# Patient Record
Sex: FEMALE | Race: WHITE | URBAN - METROPOLITAN AREA
[De-identification: names, ages, dates, MRNs, and addresses within clinical notes are randomized per-mention and may not be internally consistent; named-entity substitution may affect disease eponyms.]

---

## 2017-09-28 ENCOUNTER — APPOINTMENT (RX ONLY)
Dept: URBAN - METROPOLITAN AREA CLINIC 121 | Facility: CLINIC | Age: 72
Setting detail: DERMATOLOGY
End: 2017-09-28

## 2017-09-28 DIAGNOSIS — L57.0 ACTINIC KERATOSIS: ICD-10-CM

## 2017-09-28 DIAGNOSIS — B00.1 HERPESVIRAL VESICULAR DERMATITIS: ICD-10-CM

## 2017-09-28 DIAGNOSIS — L29.89 OTHER PRURITUS: ICD-10-CM

## 2017-09-28 DIAGNOSIS — L81.4 OTHER MELANIN HYPERPIGMENTATION: ICD-10-CM

## 2017-09-28 PROBLEM — L29.8 OTHER PRURITUS: Status: ACTIVE | Noted: 2017-09-28

## 2017-09-28 PROBLEM — D23.5 OTHER BENIGN NEOPLASM OF SKIN OF TRUNK: Status: ACTIVE | Noted: 2017-09-28

## 2017-09-28 PROCEDURE — ? REFUSAL OF TREATMENT

## 2017-09-28 PROCEDURE — ? COUNSELING

## 2017-09-28 PROCEDURE — ? TREATMENT REGIMEN

## 2017-09-28 PROCEDURE — ? PRESCRIPTION

## 2017-09-28 PROCEDURE — 99203 OFFICE O/P NEW LOW 30 MIN: CPT

## 2017-09-28 RX ORDER — BETAMETHASONE VALERATE 1 MG/ML
LOTION CUTANEOUS
Qty: 1 | Refills: 0 | Status: ERX | COMMUNITY
Start: 2017-09-28

## 2017-09-28 RX ADMIN — BETAMETHASONE VALERATE: 1 LOTION CUTANEOUS at 00:00

## 2017-09-28 ASSESSMENT — LOCATION DETAILED DESCRIPTION DERM
LOCATION DETAILED: NASAL SUPRATIP
LOCATION DETAILED: GLUTEAL CLEFT
LOCATION DETAILED: RIGHT INFERIOR MEDIAL MIDBACK
LOCATION DETAILED: LEFT ANTERIOR SHOULDER

## 2017-09-28 ASSESSMENT — LOCATION SIMPLE DESCRIPTION DERM
LOCATION SIMPLE: RIGHT LOWER BACK
LOCATION SIMPLE: NOSE
LOCATION SIMPLE: GLUTEAL CLEFT
LOCATION SIMPLE: LEFT SHOULDER

## 2017-09-28 ASSESSMENT — LOCATION ZONE DERM
LOCATION ZONE: TRUNK
LOCATION ZONE: ARM
LOCATION ZONE: NOSE

## 2017-09-28 NOTE — HPI: SKIN LESIONS
Have Your Skin Lesions Been Treated?: not been treated
Is This A New Presentation, Or A Follow-Up?: Skin Lesions
How Severe Is Your Skin Lesion?: moderate
Additional History: Pt also states her skin feels very itchy and some lesions on her buttocks.

## 2017-09-28 NOTE — PROCEDURE: TREATMENT REGIMEN
Detail Level: Zone
Plan: Sensative skin regimen
Initiate Treatment: Betamethasone valerate apply bid x 2 weeks on 2 weeks off
Plan: Pt only has crusted papules on the buttock today. We discussed that herpes is a possibility but hard to tell since lesions are healing. Encouraged pt to RTC as soon as she has another break out for culture and further evaluation.

## 2018-08-13 ENCOUNTER — APPOINTMENT (RX ONLY)
Dept: URBAN - METROPOLITAN AREA CLINIC 121 | Facility: CLINIC | Age: 73
Setting detail: DERMATOLOGY
End: 2018-08-13

## 2018-08-13 DIAGNOSIS — L30.9 DERMATITIS, UNSPECIFIED: ICD-10-CM

## 2018-08-13 PROCEDURE — 99213 OFFICE O/P EST LOW 20 MIN: CPT

## 2018-08-13 PROCEDURE — ? ORDER TESTS

## 2018-08-13 PROCEDURE — ? COUNSELING

## 2018-08-13 PROCEDURE — ? OTHER

## 2018-08-13 PROCEDURE — ? TREATMENT REGIMEN

## 2018-08-13 ASSESSMENT — LOCATION ZONE DERM: LOCATION ZONE: TRUNK

## 2018-08-13 ASSESSMENT — LOCATION SIMPLE DESCRIPTION DERM: LOCATION SIMPLE: RIGHT BUTTOCK

## 2018-08-13 ASSESSMENT — LOCATION DETAILED DESCRIPTION DERM: LOCATION DETAILED: RIGHT BUTTOCK

## 2018-08-13 NOTE — PROCEDURE: ORDER TESTS
Billing Type: [employer]
Bill For Surgical Tray: no
Performing Laboratory: [id number]
Expected Date Of Service: 08/13/2018

## 2018-08-13 NOTE — PROCEDURE: OTHER
Other (Free Text): Suggested biopsy- patient declined as she has allergy to lidocaine- unaware what reaction she has to the medication and what numbing agent she can use. Advised her to call dentist to find out what they use as she may need biopsy at follow up visit. Patient has previously been on acyclovir and developed severe upset stomach and diarrhea.
Detail Level: Zone
Note Text (......Xxx Chief Complaint.): This diagnosis correlates with the

## 2018-08-23 ENCOUNTER — APPOINTMENT (RX ONLY)
Dept: URBAN - METROPOLITAN AREA CLINIC 121 | Facility: CLINIC | Age: 73
Setting detail: DERMATOLOGY
End: 2018-08-23

## 2018-08-23 DIAGNOSIS — R20.2 PARESTHESIA OF SKIN: ICD-10-CM

## 2018-08-23 DIAGNOSIS — B00.1 HERPESVIRAL VESICULAR DERMATITIS: ICD-10-CM

## 2018-08-23 PROCEDURE — ? DIAGNOSIS COMMENT

## 2018-08-23 PROCEDURE — ? COUNSELING

## 2018-08-23 PROCEDURE — ? TREATMENT REGIMEN

## 2018-08-23 PROCEDURE — ? PRESCRIPTION

## 2018-08-23 PROCEDURE — 99213 OFFICE O/P EST LOW 20 MIN: CPT

## 2018-08-23 RX ORDER — ACYCLOVIR 50 MG/G
CREAM TOPICAL
Qty: 1 | Refills: 2 | Status: ERX | COMMUNITY
Start: 2018-08-23

## 2018-08-23 RX ADMIN — ACYCLOVIR: 50 CREAM TOPICAL at 00:00

## 2018-08-23 ASSESSMENT — LOCATION SIMPLE DESCRIPTION DERM
LOCATION SIMPLE: LOWER BACK
LOCATION SIMPLE: RIGHT UPPER BACK

## 2018-08-23 ASSESSMENT — LOCATION DETAILED DESCRIPTION DERM
LOCATION DETAILED: RIGHT SUPERIOR UPPER BACK
LOCATION DETAILED: INFERIOR LUMBAR SPINE

## 2018-08-23 ASSESSMENT — LOCATION ZONE DERM: LOCATION ZONE: TRUNK

## 2018-08-23 NOTE — PROCEDURE: TREATMENT REGIMEN
Detail Level: Detailed
Initiate Treatment: Apply topical acyclovir every 3 hrs for 7 days when develops a flare
Otc Regimen: Capzaicin

## 2018-08-27 ENCOUNTER — RX ONLY (OUTPATIENT)
Age: 73
Setting detail: RX ONLY
End: 2018-08-27

## 2018-08-27 RX ORDER — ACYCLOVIR 50 MG/G
CREAM TOPICAL
Qty: 1 | Refills: 3 | Status: ERX | COMMUNITY
Start: 2018-08-27

## 2018-08-27 RX ORDER — ACYCLOVIR 50 MG/G
CREAM TOPICAL
Qty: 1 | Refills: 3 | Status: ERX

## 2018-08-31 ENCOUNTER — RX ONLY (OUTPATIENT)
Age: 73
Setting detail: RX ONLY
End: 2018-08-31

## 2018-08-31 RX ORDER — ACYCLOVIR 50 MG/G
OINTMENT TOPICAL
Qty: 1 | Refills: 3 | Status: ERX | COMMUNITY
Start: 2018-08-31

## 2020-06-04 ENCOUNTER — OFFICE VISIT (OUTPATIENT)
Dept: URBAN - METROPOLITAN AREA CLINIC 60 | Facility: CLINIC | Age: 75
End: 2020-06-04

## 2020-07-16 ENCOUNTER — OFFICE VISIT (OUTPATIENT)
Dept: URBAN - METROPOLITAN AREA CLINIC 60 | Facility: CLINIC | Age: 75
End: 2020-07-16

## 2020-07-24 ENCOUNTER — OFFICE VISIT (OUTPATIENT)
Dept: URBAN - METROPOLITAN AREA CLINIC 63 | Facility: CLINIC | Age: 75
End: 2020-07-24

## 2020-08-27 ENCOUNTER — OFFICE VISIT (OUTPATIENT)
Dept: URBAN - METROPOLITAN AREA CLINIC 60 | Facility: CLINIC | Age: 75
End: 2020-08-27

## 2020-10-27 ENCOUNTER — OFFICE VISIT (OUTPATIENT)
Dept: URBAN - METROPOLITAN AREA CLINIC 60 | Facility: CLINIC | Age: 75
End: 2020-10-27

## 2021-03-25 ENCOUNTER — OFFICE VISIT (OUTPATIENT)
Dept: URBAN - METROPOLITAN AREA CLINIC 60 | Facility: CLINIC | Age: 76
End: 2021-03-25

## 2021-04-01 ENCOUNTER — OFFICE VISIT (OUTPATIENT)
Dept: URBAN - METROPOLITAN AREA CLINIC 60 | Facility: CLINIC | Age: 76
End: 2021-04-01

## 2021-04-14 ENCOUNTER — OFFICE VISIT (OUTPATIENT)
Dept: URBAN - METROPOLITAN AREA CLINIC 60 | Facility: CLINIC | Age: 76
End: 2021-04-14

## 2021-05-05 ENCOUNTER — OFFICE VISIT (OUTPATIENT)
Dept: URBAN - METROPOLITAN AREA CLINIC 60 | Facility: CLINIC | Age: 76
End: 2021-05-05

## 2021-06-16 ENCOUNTER — OFFICE VISIT (OUTPATIENT)
Dept: URBAN - METROPOLITAN AREA CLINIC 60 | Facility: CLINIC | Age: 76
End: 2021-06-16

## 2021-07-14 ENCOUNTER — OFFICE VISIT (OUTPATIENT)
Dept: URBAN - METROPOLITAN AREA CLINIC 60 | Facility: CLINIC | Age: 76
End: 2021-07-14

## 2021-07-15 ENCOUNTER — OFFICE VISIT (OUTPATIENT)
Dept: URBAN - METROPOLITAN AREA SURGERY CENTER 4 | Facility: SURGERY CENTER | Age: 76
End: 2021-07-15

## 2021-07-22 ENCOUNTER — OFFICE VISIT (OUTPATIENT)
Dept: URBAN - METROPOLITAN AREA SURGERY CENTER 4 | Facility: SURGERY CENTER | Age: 76
End: 2021-07-22

## 2021-08-11 ENCOUNTER — OFFICE VISIT (OUTPATIENT)
Dept: URBAN - METROPOLITAN AREA CLINIC 60 | Facility: CLINIC | Age: 76
End: 2021-08-11

## 2021-08-25 ENCOUNTER — OFFICE VISIT (OUTPATIENT)
Dept: URBAN - METROPOLITAN AREA CLINIC 60 | Facility: CLINIC | Age: 76
End: 2021-08-25

## 2021-09-01 ENCOUNTER — OFFICE VISIT (OUTPATIENT)
Dept: URBAN - METROPOLITAN AREA CLINIC 60 | Facility: CLINIC | Age: 76
End: 2021-09-01

## 2021-09-02 ENCOUNTER — LAB OUTSIDE AN ENCOUNTER (OUTPATIENT)
Dept: URBAN - METROPOLITAN AREA CLINIC 121 | Facility: CLINIC | Age: 76
End: 2021-09-02

## 2021-09-20 LAB — HELICOBACTER PYLORI AG, EIA, STOOL: (no result)

## 2021-09-28 LAB
IMMUNOGLOBULIN A: (no result)
INTERPRETATION: (no result)
TISSUE TRANSGLUTAMINASE AB, IGA: (no result)

## 2021-10-11 ENCOUNTER — OFFICE VISIT (OUTPATIENT)
Dept: URBAN - METROPOLITAN AREA SURGERY CENTER 4 | Facility: SURGERY CENTER | Age: 76
End: 2021-10-11

## 2021-10-20 ENCOUNTER — OFFICE VISIT (OUTPATIENT)
Dept: URBAN - METROPOLITAN AREA CLINIC 60 | Facility: CLINIC | Age: 76
End: 2021-10-20

## 2022-01-04 ENCOUNTER — OFFICE VISIT (OUTPATIENT)
Dept: URBAN - METROPOLITAN AREA CLINIC 63 | Facility: CLINIC | Age: 77
End: 2022-01-04

## 2022-01-07 ENCOUNTER — OFFICE VISIT (OUTPATIENT)
Dept: URBAN - METROPOLITAN AREA CLINIC 63 | Facility: CLINIC | Age: 77
End: 2022-01-07

## 2022-01-19 ENCOUNTER — OFFICE VISIT (OUTPATIENT)
Dept: URBAN - METROPOLITAN AREA CLINIC 60 | Facility: CLINIC | Age: 77
End: 2022-01-19

## 2022-01-24 ENCOUNTER — OFFICE VISIT (OUTPATIENT)
Dept: URBAN - METROPOLITAN AREA SURGERY CENTER 4 | Facility: SURGERY CENTER | Age: 77
End: 2022-01-24

## 2022-01-27 LAB — PATHOLOGY (INDENTED REPORT): (no result)

## 2022-02-09 ENCOUNTER — OFFICE VISIT (OUTPATIENT)
Dept: URBAN - METROPOLITAN AREA CLINIC 60 | Facility: CLINIC | Age: 77
End: 2022-02-09

## 2022-04-06 ENCOUNTER — OFFICE VISIT (OUTPATIENT)
Dept: URBAN - METROPOLITAN AREA CLINIC 60 | Facility: CLINIC | Age: 77
End: 2022-04-06

## 2022-05-21 ENCOUNTER — APPOINTMENT (RX ONLY)
Dept: URBAN - METROPOLITAN AREA CLINIC 334 | Facility: CLINIC | Age: 77
Setting detail: DERMATOLOGY
End: 2022-05-21

## 2022-05-21 DIAGNOSIS — L29.89 OTHER PRURITUS: ICD-10-CM | Status: WORSENING

## 2022-05-21 DIAGNOSIS — L82.1 OTHER SEBORRHEIC KERATOSIS: ICD-10-CM

## 2022-05-21 DIAGNOSIS — L81.4 OTHER MELANIN HYPERPIGMENTATION: ICD-10-CM

## 2022-05-21 DIAGNOSIS — Z12.83 ENCOUNTER FOR SCREENING FOR MALIGNANT NEOPLASM OF SKIN: ICD-10-CM

## 2022-05-21 DIAGNOSIS — L29.8 OTHER PRURITUS: ICD-10-CM | Status: WORSENING

## 2022-05-21 DIAGNOSIS — D18.0 HEMANGIOMA: ICD-10-CM

## 2022-05-21 PROBLEM — D18.01 HEMANGIOMA OF SKIN AND SUBCUTANEOUS TISSUE: Status: ACTIVE | Noted: 2022-05-21

## 2022-05-21 PROCEDURE — ? PRESCRIPTION

## 2022-05-21 PROCEDURE — ? FULL BODY SKIN EXAM

## 2022-05-21 PROCEDURE — 99214 OFFICE O/P EST MOD 30 MIN: CPT

## 2022-05-21 PROCEDURE — ? COUNSELING

## 2022-05-21 PROCEDURE — ? TREATMENT REGIMEN

## 2022-05-21 RX ORDER — TRIAMCINOLONE ACETONIDE 1 MG/G
CREAM TOPICAL BID
Qty: 453.6 | Refills: 3 | Status: ERX | COMMUNITY
Start: 2022-05-21

## 2022-05-21 RX ADMIN — TRIAMCINOLONE ACETONIDE: 1 CREAM TOPICAL at 00:00

## 2022-05-21 ASSESSMENT — LOCATION DETAILED DESCRIPTION DERM
LOCATION DETAILED: RIGHT INFERIOR MEDIAL UPPER BACK
LOCATION DETAILED: LEFT MEDIAL SUPERIOR CHEST
LOCATION DETAILED: UPPER STERNUM
LOCATION DETAILED: PERIUMBILICAL SKIN

## 2022-05-21 ASSESSMENT — LOCATION SIMPLE DESCRIPTION DERM
LOCATION SIMPLE: RIGHT UPPER BACK
LOCATION SIMPLE: CHEST
LOCATION SIMPLE: ABDOMEN

## 2022-05-21 ASSESSMENT — LOCATION ZONE DERM: LOCATION ZONE: TRUNK

## 2022-05-21 NOTE — PROCEDURE: COUNSELING
Antihistamine Recommendations: Take a Claritin daily
Cleanser Recommendations: Use cerave  only
Moisturizer Recommendations: Use cerave moisturizer daily
Detail Level: Detailed
Detail Level: Generalized

## 2022-05-21 NOTE — HPI: EVALUATION OF SKIN LESION(S)
What Type Of Note Output Would You Prefer (Optional)?: Bullet Format
Hpi Title: Evaluation of Skin Lesions
Additional History: Patient is itchy all over. She has a list of drug allergies.

## 2022-06-14 ENCOUNTER — OFFICE VISIT (OUTPATIENT)
Dept: URBAN - METROPOLITAN AREA CLINIC 63 | Facility: CLINIC | Age: 77
End: 2022-06-14

## 2022-07-05 ENCOUNTER — TELEPHONE ENCOUNTER (OUTPATIENT)
Dept: URBAN - METROPOLITAN AREA CLINIC 63 | Facility: CLINIC | Age: 77
End: 2022-07-05

## 2022-07-06 ENCOUNTER — TELEPHONE ENCOUNTER (OUTPATIENT)
Dept: URBAN - METROPOLITAN AREA CLINIC 63 | Facility: CLINIC | Age: 77
End: 2022-07-06

## 2022-07-09 ENCOUNTER — TELEPHONE ENCOUNTER (OUTPATIENT)
Dept: URBAN - METROPOLITAN AREA CLINIC 121 | Facility: CLINIC | Age: 77
End: 2022-07-09

## 2022-07-09 RX ORDER — LORAZEPAM 1 MG/1
TABLET ORAL TWICE A DAY
Refills: 0 | OUTPATIENT
Start: 2019-08-16 | End: 2019-09-24

## 2022-07-09 RX ORDER — NON-ADHERENT BANDAGE 3"X4"
BANDAGE TOPICAL
Refills: 0 | OUTPATIENT
Start: 2019-03-06 | End: 2019-08-16

## 2022-07-09 RX ORDER — CYANOCOBALAMIN 1000 UG/ML
TWICE A MONTH INJECTION INTRAMUSCULAR; SUBCUTANEOUS
Refills: 0 | OUTPATIENT
Start: 2019-10-09 | End: 2022-01-07

## 2022-07-09 RX ORDER — DOXEPIN HYDROCHLORIDE 10 MG/1
CAPSULE ORAL ONCE A DAY
Refills: 0 | OUTPATIENT
Start: 2022-01-07 | End: 2022-01-19

## 2022-07-09 RX ORDER — PANTOPRAZOLE SODIUM 40 MG/1
TABLET, DELAYED RELEASE ORAL TWICE A DAY
Refills: 0 | OUTPATIENT
Start: 2019-08-16 | End: 2019-09-24

## 2022-07-09 RX ORDER — LORAZEPAM 1 MG/1
TABLET ORAL TWICE A DAY
Refills: 0 | OUTPATIENT
Start: 2021-05-05 | End: 2021-08-25

## 2022-07-09 RX ORDER — SENNOSIDES 8.6 MG/1
3 TAB HS TABLET, FILM COATED ORAL ONCE A DAY
Refills: 0 | OUTPATIENT
Start: 2021-05-05 | End: 2021-05-05

## 2022-07-09 RX ORDER — TIZANIDINE 2 MG/1
TABLET ORAL ONCE A DAY
Refills: 0 | OUTPATIENT
Start: 2022-01-07 | End: 2022-01-19

## 2022-07-09 RX ORDER — LORAZEPAM 1 MG/1
TABLET ORAL TWICE A DAY
Refills: 0 | OUTPATIENT
Start: 2019-06-24 | End: 2019-08-16

## 2022-07-09 RX ORDER — TIZANIDINE 2 MG/1
TABLET ORAL
Refills: 0 | OUTPATIENT
Start: 2021-08-25 | End: 2022-01-07

## 2022-07-09 RX ORDER — NON-ADHERENT BANDAGE 3"X4"
BANDAGE TOPICAL
Refills: 0 | OUTPATIENT
Start: 2019-09-24 | End: 2019-10-09

## 2022-07-09 RX ORDER — SENNOSIDES 8.6 MG
TABLET ORAL
Refills: 0 | OUTPATIENT
Start: 2019-06-24 | End: 2019-08-16

## 2022-07-09 RX ORDER — PRUCALOPRIDE 2 MG/1
ONCE A DAY TABLET, FILM COATED ORAL ONCE A DAY
Refills: 0 | OUTPATIENT
Start: 2021-06-17 | End: 2021-08-25

## 2022-07-09 RX ORDER — PANTOPRAZOLE 20 MG/1
TWICE A DAY TABLET, DELAYED RELEASE ORAL TWICE A DAY
Refills: 1 | OUTPATIENT
Start: 2019-09-24 | End: 2020-05-15

## 2022-07-09 RX ORDER — RABEPRAZOLE SODIUM 20 MG/1
TWICE A DAY TABLET, DELAYED RELEASE ORAL TWICE A DAY
Refills: 1 | OUTPATIENT
Start: 2021-08-25 | End: 2022-01-07

## 2022-07-09 RX ORDER — SENNOSIDES 8.6 MG/1
3 TAB HS TABLET, FILM COATED ORAL
Refills: 0 | OUTPATIENT
Start: 2019-09-24 | End: 2019-10-09

## 2022-07-09 RX ORDER — DOXEPIN HYDROCHLORIDE 10 MG/1
CAPSULE ORAL ONCE A DAY
Refills: 0 | OUTPATIENT
Start: 2022-01-19 | End: 2022-06-14

## 2022-07-09 RX ORDER — PANTOPRAZOLE SODIUM 40 MG/1
TABLET, DELAYED RELEASE ORAL
Refills: 0 | OUTPATIENT
Start: 2018-03-06 | End: 2018-05-14

## 2022-07-09 RX ORDER — CHLORHEXIDINE GLUCONATE 1.2 MG/ML
RINSE ORAL ONCE A DAY
Refills: 0 | OUTPATIENT
Start: 2022-01-07 | End: 2022-01-19

## 2022-07-09 RX ORDER — SIMETHICONE 125 MG
PRN CAPSULE ORAL
Refills: 0 | OUTPATIENT
Start: 2018-10-02 | End: 2019-03-06

## 2022-07-09 RX ORDER — PANTOPRAZOLE 20 MG/1
TABLET, DELAYED RELEASE ORAL ONCE A DAY
Refills: 0 | OUTPATIENT
Start: 2022-01-19 | End: 2022-01-19

## 2022-07-09 RX ORDER — CYANOCOBALAMIN 1000 UG/ML
TWICE A MONTH INJECTION INTRAMUSCULAR; SUBCUTANEOUS
Refills: 0 | OUTPATIENT
Start: 2019-09-24 | End: 2019-10-09

## 2022-07-09 RX ORDER — SIMETHICONE 125 MG
PRN CAPSULE ORAL
Refills: 0 | OUTPATIENT
Start: 2019-10-09 | End: 2021-05-05

## 2022-07-09 RX ORDER — LORAZEPAM 1 MG/1
TABLET ORAL
Refills: 0 | OUTPATIENT
Start: 2018-10-02 | End: 2019-03-06

## 2022-07-09 RX ORDER — CALCIUM CARBONATE 500 MG/1
TABLET, CHEWABLE ORAL AS NEEDED
Refills: 0 | OUTPATIENT
Start: 2022-01-19 | End: 2022-06-14

## 2022-07-09 RX ORDER — CALCIUM CARBONATE 500 MG/1
TABLET, CHEWABLE ORAL
Refills: 0 | OUTPATIENT
Start: 2019-08-16 | End: 2019-09-24

## 2022-07-09 RX ORDER — LORAZEPAM 1 MG/1
TABLET ORAL
Refills: 0 | OUTPATIENT
Start: 2019-03-06 | End: 2019-06-24

## 2022-07-09 RX ORDER — PSYLLIUM HUSK 3.4G/5.8G
POWDER (GRAM) ORAL
Refills: 0 | OUTPATIENT
Start: 2018-08-20 | End: 2018-10-02

## 2022-07-09 RX ORDER — PANTOPRAZOLE SODIUM 40 MG/1
TABLET, DELAYED RELEASE ORAL
Refills: 0 | OUTPATIENT
Start: 2018-05-14 | End: 2018-08-20

## 2022-07-09 RX ORDER — MILK THISTLE 100 %
POWDER (GRAM) MISCELLANEOUS
Refills: 0 | OUTPATIENT
Start: 2021-08-25 | End: 2022-01-07

## 2022-07-09 RX ORDER — SIMETHICONE 125 MG
PRN CAPSULE ORAL
Refills: 0 | OUTPATIENT
Start: 2019-09-24 | End: 2019-10-09

## 2022-07-09 RX ORDER — TIZANIDINE 2 MG/1
TABLET ORAL
Refills: 0 | OUTPATIENT
Start: 2021-04-21 | End: 2021-08-25

## 2022-07-09 RX ORDER — SENNOSIDES 8.6 MG
TABLET ORAL
Refills: 0 | OUTPATIENT
Start: 2018-10-02 | End: 2019-03-06

## 2022-07-09 RX ORDER — MILK THISTLE 100 %
POWDER (GRAM) MISCELLANEOUS
Refills: 0 | OUTPATIENT
Start: 2021-05-05 | End: 2021-08-25

## 2022-07-09 RX ORDER — SIMETHICONE 125 MG
PRN CAPSULE ORAL
Refills: 0 | OUTPATIENT
Start: 2019-08-16 | End: 2019-09-24

## 2022-07-09 RX ORDER — CALCIUM CARBONATE 500 MG/1
TABLET, CHEWABLE ORAL
Refills: 0 | OUTPATIENT
Start: 2019-10-09 | End: 2021-05-05

## 2022-07-09 RX ORDER — LORAZEPAM 1 MG/1
TABLET ORAL ONCE A DAY
Refills: 0 | OUTPATIENT
Start: 2022-01-19 | End: 2022-06-14

## 2022-07-09 RX ORDER — PANTOPRAZOLE 20 MG/1
TWICE A DAY TABLET, DELAYED RELEASE ORAL TWICE A DAY
Refills: 1 | OUTPATIENT
Start: 2021-05-06 | End: 2021-08-25

## 2022-07-09 RX ORDER — SENNOSIDES 8.6 MG
TABLET ORAL
Refills: 0 | OUTPATIENT
Start: 2019-03-06 | End: 2019-06-24

## 2022-07-09 RX ORDER — PANTOPRAZOLE 20 MG/1
TAKE 1 TABLET BY MOUTH TWICE DAILY TABLET, DELAYED RELEASE ORAL
Refills: 3 | OUTPATIENT
Start: 2020-07-08 | End: 2020-10-27

## 2022-07-09 RX ORDER — LANSOPRAZOLE 30 MG/1
TWICE A DAY CAPSULE, DELAYED RELEASE ORAL TWICE A DAY
Refills: 1 | OUTPATIENT
Start: 2021-08-25 | End: 2021-08-25

## 2022-07-09 RX ORDER — SIMETHICONE 125 MG
PRN CAPSULE ORAL ONCE A DAY
Refills: 0 | OUTPATIENT
Start: 2022-01-19 | End: 2022-06-14

## 2022-07-09 RX ORDER — SENNOSIDES 8.6 MG
TABLET ORAL
Refills: 0 | OUTPATIENT
Start: 2019-09-24 | End: 2019-10-09

## 2022-07-09 RX ORDER — SIMETHICONE 125 MG
PRN CAPSULE ORAL ONCE A DAY
Refills: 0 | OUTPATIENT
Start: 2022-01-07 | End: 2022-01-19

## 2022-07-09 RX ORDER — SUCRALFATE 1 G/1
TWICE A DAY TABLET ORAL TWICE A DAY
Refills: 1 | OUTPATIENT
Start: 2018-08-23 | End: 2018-10-02

## 2022-07-09 RX ORDER — NON-ADHERENT BANDAGE 3"X4"
BANDAGE TOPICAL
Refills: 0 | OUTPATIENT
Start: 2019-10-09 | End: 2022-01-07

## 2022-07-09 RX ORDER — CALCIUM CARBONATE 500 MG/1
TABLET, CHEWABLE ORAL TAKE AS DIRECTED
Refills: 0 | OUTPATIENT
Start: 2022-01-07 | End: 2022-01-19

## 2022-07-09 RX ORDER — SENNOSIDES 8.6 MG/1
3 TAB HS TABLET, FILM COATED ORAL
Refills: 0 | OUTPATIENT
Start: 2019-10-09 | End: 2021-05-05

## 2022-07-09 RX ORDER — PANTOPRAZOLE SODIUM 40 MG/1
TWICE A DAY TABLET, DELAYED RELEASE ORAL TWICE A DAY
Refills: 3 | OUTPATIENT
Start: 2021-05-05 | End: 2021-08-25

## 2022-07-09 RX ORDER — EMPAGLIFLOZIN 10 MG/1
TABLET, FILM COATED ORAL
Refills: 0 | OUTPATIENT
Start: 2021-02-23 | End: 2021-08-25

## 2022-07-09 RX ORDER — ATENOLOL 25 MG/1
TABLET ORAL ONCE A DAY
Refills: 0 | OUTPATIENT
Start: 2022-01-07 | End: 2022-01-19

## 2022-07-09 RX ORDER — SENNOSIDES 8.6 MG/1
3 TAB HS TABLET, FILM COATED ORAL
Refills: 0 | OUTPATIENT
Start: 2019-08-16 | End: 2019-09-24

## 2022-07-09 RX ORDER — SENNOSIDES 8.6 MG
TABLET ORAL
Refills: 0 | OUTPATIENT
Start: 2018-08-23 | End: 2018-10-02

## 2022-07-09 RX ORDER — SENNOSIDES 8.6 MG
TABLET ORAL AS NEEDED
Refills: 0 | OUTPATIENT
Start: 2022-01-19 | End: 2022-06-14

## 2022-07-09 RX ORDER — PANTOPRAZOLE SODIUM 40 MG/1
TABLET, DELAYED RELEASE ORAL
Refills: 0 | OUTPATIENT
Start: 2019-06-24 | End: 2019-06-24

## 2022-07-09 RX ORDER — CHLORDIAZEPOXIDE HYDROCHLORIDE AND CLIDINIUM BROMIDE 5; 2.5 MG/1; MG/1
THREE TIMES A DAY AS NEEDED FOR ABDOMINAL CAPSULE ORAL THREE TIMES A DAY
Refills: 1 | OUTPATIENT
Start: 2020-07-24 | End: 2020-10-27

## 2022-07-09 RX ORDER — LORAZEPAM 1 MG/1
TABLET ORAL
Refills: 0 | OUTPATIENT
Start: 2018-03-06 | End: 2018-05-14

## 2022-07-09 RX ORDER — CHLORHEXIDINE GLUCONATE 1.2 MG/ML
RINSE ORAL ONCE A DAY
Refills: 0 | OUTPATIENT
Start: 2022-01-19 | End: 2022-06-14

## 2022-07-09 RX ORDER — PSYLLIUM HUSK 3.4G/5.8G
POWDER (GRAM) ORAL
Refills: 0 | OUTPATIENT
Start: 2018-05-14 | End: 2018-08-20

## 2022-07-09 RX ORDER — SENNOSIDES 8.6 MG
TABLET ORAL
Refills: 0 | OUTPATIENT
Start: 2019-08-16 | End: 2019-09-24

## 2022-07-09 RX ORDER — SIMETHICONE 125 MG
PRN CAPSULE ORAL ONCE A DAY
Refills: 0 | OUTPATIENT
Start: 2021-05-05 | End: 2022-01-07

## 2022-07-09 RX ORDER — LORAZEPAM 1 MG/1
TABLET ORAL TWICE A DAY
Refills: 0 | OUTPATIENT
Start: 2019-09-24 | End: 2019-10-09

## 2022-07-09 RX ORDER — RIVAROXABAN 20 MG/1
TABLET, FILM COATED ORAL
Refills: 0 | OUTPATIENT
Start: 2021-02-23 | End: 2021-08-25

## 2022-07-09 RX ORDER — TIZANIDINE 2 MG/1
TABLET ORAL ONCE A DAY
Refills: 0 | OUTPATIENT
Start: 2022-01-19 | End: 2022-06-14

## 2022-07-09 RX ORDER — PANTOPRAZOLE 20 MG/1
TABLET, DELAYED RELEASE ORAL
Refills: 0 | OUTPATIENT
Start: 2022-01-07 | End: 2022-01-19

## 2022-07-09 RX ORDER — PANTOPRAZOLE SODIUM 40 MG/1
TABLET, DELAYED RELEASE ORAL
Refills: 0 | OUTPATIENT
Start: 2018-08-20 | End: 2018-10-02

## 2022-07-09 RX ORDER — RABEPRAZOLE SODIUM 20 MG/1
TABLET, DELAYED RELEASE ORAL ONCE A DAY
Refills: 0 | OUTPATIENT
Start: 2022-01-07 | End: 2022-01-07

## 2022-07-09 RX ORDER — DOXYCYCLINE HYCLATE 100 MG/1
TABLET ORAL
Refills: 0 | OUTPATIENT
Start: 2021-03-09 | End: 2021-05-05

## 2022-07-09 RX ORDER — SENNOSIDES 8.6 MG
TABLET ORAL
Refills: 0 | OUTPATIENT
Start: 2019-10-09 | End: 2020-10-27

## 2022-07-09 RX ORDER — GLYCERIN 5.4 G/1
LIQUID RECTAL
Refills: 0 | OUTPATIENT
Start: 2019-09-24 | End: 2019-10-09

## 2022-07-09 RX ORDER — SIMETHICONE 125 MG
PRN CAPSULE ORAL
Refills: 0 | OUTPATIENT
Start: 2019-06-24 | End: 2019-08-16

## 2022-07-09 RX ORDER — COCOA BUTTER, PHENYLEPHRINE HCL 2211; 6.5 MG/1; MG/1
SUPPOSITORY RECTAL TAKE AS DIRECTED
Refills: 0 | OUTPATIENT
Start: 2020-10-27 | End: 2021-08-25

## 2022-07-09 RX ORDER — FAMOTIDINE 10 MG
ONCE A DAY TABLET ORAL ONCE A DAY
Refills: 3 | OUTPATIENT
Start: 2018-08-23 | End: 2018-10-02

## 2022-07-09 RX ORDER — OMEPRAZOLE 40 MG/1
ONCE A DAY CAPSULE, DELAYED RELEASE ORAL ONCE A DAY
Refills: 1 | OUTPATIENT
Start: 2019-08-21 | End: 2020-05-15

## 2022-07-09 RX ORDER — NON-ADHERENT BANDAGE 3"X4"
BANDAGE TOPICAL
Refills: 0 | OUTPATIENT
Start: 2019-08-16 | End: 2019-09-24

## 2022-07-09 RX ORDER — CALCIUM CARBONATE 500 MG/1
TABLET, CHEWABLE ORAL
Refills: 0 | OUTPATIENT
Start: 2019-09-24 | End: 2019-10-09

## 2022-07-09 RX ORDER — PANTOPRAZOLE 20 MG/1
TWICE A DAY TABLET, DELAYED RELEASE ORAL TWICE A DAY
Refills: 3 | OUTPATIENT
Start: 2018-08-23 | End: 2018-10-02

## 2022-07-09 RX ORDER — BISMUTH SUBSALICYLATE 262 MG
TABLET,CHEWABLE ORAL AS NEEDED
Refills: 0 | OUTPATIENT
Start: 2022-01-19 | End: 2022-06-14

## 2022-07-09 RX ORDER — RABEPRAZOLE SODIUM 20 MG/1
TWICE A DAY TABLET, DELAYED RELEASE ORAL TWICE A DAY
Refills: 1 | OUTPATIENT
Start: 2019-03-06 | End: 2019-06-24

## 2022-07-09 RX ORDER — HYDROCORTISONE 2.5% 25 MG/G
FOUR TIMES A DAY CREAM TOPICAL
Refills: 0 | OUTPATIENT
Start: 2019-03-06 | End: 2019-06-24

## 2022-07-09 RX ORDER — CYANOCOBALAMIN 1000 UG/ML
TWICE A MONTH INJECTION INTRAMUSCULAR; SUBCUTANEOUS
Refills: 0 | OUTPATIENT
Start: 2019-08-16 | End: 2019-09-24

## 2022-07-09 RX ORDER — PANTOPRAZOLE SODIUM 40 MG/1
TABLET, DELAYED RELEASE ORAL TWICE A DAY
Refills: 0 | OUTPATIENT
Start: 2019-06-24 | End: 2019-08-16

## 2022-07-09 RX ORDER — CYANOCOBALAMIN 1000 UG/ML
TWICE A MONTH INJECTION INTRAMUSCULAR; SUBCUTANEOUS
Refills: 0 | OUTPATIENT
Start: 2019-06-24 | End: 2019-08-16

## 2022-07-09 RX ORDER — GLYCERIN 5.4 G/1
LIQUID RECTAL
Refills: 0 | OUTPATIENT
Start: 2019-10-09 | End: 2020-10-27

## 2022-07-09 RX ORDER — MILK THISTLE 100 %
POWDER (GRAM) MISCELLANEOUS
Refills: 0 | OUTPATIENT
Start: 2019-10-09 | End: 2021-05-05

## 2022-07-09 RX ORDER — GLYCERIN 5.4 G/1
LIQUID RECTAL
Refills: 0 | OUTPATIENT
Start: 2019-08-16 | End: 2019-09-24

## 2022-07-09 RX ORDER — LORAZEPAM 1 MG/1
TABLET ORAL
Refills: 0 | OUTPATIENT
Start: 2018-08-20 | End: 2018-10-02

## 2022-07-09 RX ORDER — LORAZEPAM 1 MG/1
TABLET ORAL TWICE A DAY
Refills: 0 | OUTPATIENT
Start: 2019-10-09 | End: 2021-05-05

## 2022-07-09 RX ORDER — LACTULOSE 10 G/15ML
15ML 1-2 TIMES DAILY FOR CONSTIPATION SOLUTION ORAL
Refills: 2 | OUTPATIENT
Start: 2021-05-05 | End: 2021-08-25

## 2022-07-09 RX ORDER — PANTOPRAZOLE 20 MG/1
TABLET, DELAYED RELEASE ORAL ONCE A DAY
Refills: 0 | OUTPATIENT
Start: 2022-01-07 | End: 2022-01-07

## 2022-07-09 RX ORDER — DICYCLOMINE HYDROCHLORIDE 10 MG/1
1 CAPSULE UP TO THREE TIMES A DAY AS NEEDED FOR ABDOMINAL PAIN CAPSULE ORAL THREE TIMES A DAY
Refills: 2 | OUTPATIENT
Start: 2020-05-15 | End: 2020-10-27

## 2022-07-09 RX ORDER — LORAZEPAM 1 MG/1
TABLET ORAL TWICE A DAY
Refills: 0 | OUTPATIENT
Start: 2021-08-25 | End: 2022-01-07

## 2022-07-09 RX ORDER — PANTOPRAZOLE 20 MG/1
TWICE A DAY TABLET, DELAYED RELEASE ORAL TWICE A DAY
Refills: 0 | OUTPATIENT
Start: 2021-04-06 | End: 2021-08-25

## 2022-07-09 RX ORDER — ATENOLOL 25 MG/1
1.25 MG TABLET ORAL
Refills: 0 | OUTPATIENT
Start: 2022-01-19 | End: 2022-06-14

## 2022-07-09 RX ORDER — PANTOPRAZOLE SODIUM 40 MG/1
TABLET, DELAYED RELEASE ORAL ONCE A DAY
Refills: 0 | OUTPATIENT
Start: 2020-10-27 | End: 2021-08-25

## 2022-07-09 RX ORDER — LORAZEPAM 1 MG/1
TABLET ORAL ONCE A DAY
Refills: 0 | OUTPATIENT
Start: 2022-01-07 | End: 2022-01-19

## 2022-07-09 RX ORDER — PSYLLIUM HUSK 3.4G/5.8G
POWDER (GRAM) ORAL
Refills: 0 | OUTPATIENT
Start: 2018-03-06 | End: 2018-05-14

## 2022-07-09 RX ORDER — SIMETHICONE 125 MG
PRN CAPSULE ORAL
Refills: 0 | OUTPATIENT
Start: 2019-03-06 | End: 2019-06-24

## 2022-07-09 RX ORDER — CALCIUM CARBONATE 500 MG/1
TABLET, CHEWABLE ORAL TAKE AS DIRECTED
Refills: 0 | OUTPATIENT
Start: 2021-05-05 | End: 2022-01-07

## 2022-07-09 RX ORDER — PANTOPRAZOLE SODIUM 40 MG/1
TABLET, DELAYED RELEASE ORAL TWICE A DAY
Refills: 0 | OUTPATIENT
Start: 2019-09-24 | End: 2019-10-09

## 2022-07-09 RX ORDER — LORAZEPAM 1 MG/1
TABLET ORAL
Refills: 0 | OUTPATIENT
Start: 2018-05-14 | End: 2018-08-20

## 2022-07-09 RX ORDER — PANTOPRAZOLE 20 MG/1
TABLET, DELAYED RELEASE ORAL ONCE A DAY
Refills: 0 | OUTPATIENT
Start: 2022-01-19 | End: 2022-06-14

## 2022-07-09 RX ORDER — PANTOPRAZOLE SODIUM 40 MG/1
TABLET, DELAYED RELEASE ORAL TWICE A DAY
Refills: 0 | OUTPATIENT
Start: 2019-10-09 | End: 2020-07-24

## 2022-07-10 ENCOUNTER — TELEPHONE ENCOUNTER (OUTPATIENT)
Dept: URBAN - METROPOLITAN AREA CLINIC 121 | Facility: CLINIC | Age: 77
End: 2022-07-10

## 2022-07-10 RX ORDER — DICYCLOMINE HYDROCHLORIDE 10 MG/1
THREE TIMES A DAY AS NEEDED FOR ABDOMINAL PAIN CAPSULE ORAL THREE TIMES A DAY
Refills: 3 | Status: ACTIVE | COMMUNITY
Start: 2022-06-14

## 2022-07-10 RX ORDER — CALCIUM CARBONATE 500 MG/1
TABLET, CHEWABLE ORAL AS NEEDED
Refills: 0 | Status: ACTIVE | COMMUNITY
Start: 2022-06-14

## 2022-07-10 RX ORDER — SIMETHICONE 180 MG
THREE TIMES A DAY AS NEEDED FOR GAS AND BLOATING CAPSULE ORAL THREE TIMES A DAY
Refills: 5 | Status: ACTIVE | COMMUNITY
Start: 2022-01-19

## 2022-07-10 RX ORDER — SIMETHICONE 125 MG
PRN CAPSULE ORAL ONCE A DAY
Refills: 0 | Status: ACTIVE | COMMUNITY
Start: 2022-06-14

## 2022-07-10 RX ORDER — SENNOSIDES 8.6 MG
TABLET ORAL AS NEEDED
Refills: 0 | Status: ACTIVE | COMMUNITY
Start: 2022-06-14

## 2022-07-10 RX ORDER — DOXEPIN HYDROCHLORIDE 10 MG/1
CAPSULE ORAL ONCE A DAY
Refills: 0 | Status: ACTIVE | COMMUNITY
Start: 2022-06-14

## 2022-07-10 RX ORDER — TIZANIDINE 2 MG/1
TABLET ORAL ONCE A DAY
Refills: 0 | Status: ACTIVE | COMMUNITY
Start: 2022-06-14

## 2022-07-10 RX ORDER — LORAZEPAM 1 MG/1
TABLET ORAL ONCE A DAY
Refills: 0 | Status: ACTIVE | COMMUNITY
Start: 2022-06-14

## 2022-07-10 RX ORDER — FAMOTIDINE 10 MG
ONCE A DAY TABLET ORAL ONCE A DAY
Refills: 1 | Status: ACTIVE | COMMUNITY
Start: 2019-08-20

## 2022-07-10 RX ORDER — BISMUTH SUBSALICYLATE 262 MG
TABLET,CHEWABLE ORAL AS NEEDED
Refills: 0 | Status: ACTIVE | COMMUNITY
Start: 2022-06-14

## 2022-07-10 RX ORDER — METRONIDAZOLE 500 MG/1
THREE TIMES A DAY TABLET ORAL THREE TIMES A DAY
Refills: 0 | Status: ACTIVE | COMMUNITY
Start: 2018-08-23

## 2022-07-10 RX ORDER — EMPAGLIFLOZIN 10 MG/1
TABLET, FILM COATED ORAL
Refills: 0 | Status: ACTIVE | COMMUNITY
Start: 2021-08-25

## 2022-07-10 RX ORDER — DICLOFENAC SODIUM TOPICAL GEL, 1%, 10 MG/G
APPLY 1 GRAM TOPICALLY 2-3 TIMES PER DAY GEL TOPICAL
Refills: 1 | Status: ACTIVE | COMMUNITY
Start: 2022-01-07

## 2022-07-10 RX ORDER — PANTOPRAZOLE SODIUM 40 MG/1
TABLET, DELAYED RELEASE ORAL TWICE A DAY
Refills: 0 | Status: ACTIVE | COMMUNITY
Start: 2019-12-05

## 2022-07-10 RX ORDER — CHLORHEXIDINE GLUCONATE 1.2 MG/ML
RINSE ORAL ONCE A DAY
Refills: 0 | Status: ACTIVE | COMMUNITY
Start: 2022-06-14

## 2022-07-10 RX ORDER — PANTOPRAZOLE 20 MG/1
TAKE ONE TABLET BY MOUTH TWICE A DAY TABLET, DELAYED RELEASE ORAL
Refills: 3 | Status: ACTIVE | COMMUNITY
Start: 2022-05-24

## 2022-07-10 RX ORDER — SUCRALFATE 1 G/1
TWICE A DAY TABLET ORAL TWICE A DAY
Refills: 0 | Status: ACTIVE | COMMUNITY
Start: 2019-08-16

## 2022-07-30 ENCOUNTER — TELEPHONE ENCOUNTER (OUTPATIENT)
Age: 77
End: 2022-07-30

## 2022-07-31 ENCOUNTER — TELEPHONE ENCOUNTER (OUTPATIENT)
Age: 77
End: 2022-07-31

## 2022-08-05 ENCOUNTER — OFFICE VISIT (OUTPATIENT)
Dept: URBAN - METROPOLITAN AREA CLINIC 63 | Facility: CLINIC | Age: 77
End: 2022-08-05

## 2022-09-14 ENCOUNTER — OFFICE VISIT (OUTPATIENT)
Dept: URBAN - METROPOLITAN AREA CLINIC 60 | Facility: CLINIC | Age: 77
End: 2022-09-14
Payer: COMMERCIAL

## 2022-09-14 VITALS
HEART RATE: 70 BPM | HEIGHT: 63 IN | WEIGHT: 124.6 LBS | TEMPERATURE: 98.3 F | BODY MASS INDEX: 22.08 KG/M2 | OXYGEN SATURATION: 99 % | SYSTOLIC BLOOD PRESSURE: 122 MMHG | RESPIRATION RATE: 12 BRPM | DIASTOLIC BLOOD PRESSURE: 68 MMHG

## 2022-09-14 DIAGNOSIS — K58.1 IRRITABLE BOWEL SYNDROME WITH CONSTIPATION: ICD-10-CM

## 2022-09-14 PROBLEM — 440630006: Status: ACTIVE | Noted: 2022-09-14

## 2022-09-14 PROCEDURE — 99213 OFFICE O/P EST LOW 20 MIN: CPT | Performed by: INTERNAL MEDICINE

## 2022-09-14 RX ORDER — CALCIUM CARBONATE 500 MG/1
TABLET, CHEWABLE ORAL AS NEEDED
Refills: 0 | Status: ACTIVE | COMMUNITY
Start: 2022-06-14

## 2022-09-14 RX ORDER — PANTOPRAZOLE SODIUM 40 MG/1
TABLET, DELAYED RELEASE ORAL TWICE A DAY
Refills: 0 | Status: ACTIVE | COMMUNITY
Start: 2019-12-05

## 2022-09-14 RX ORDER — LORAZEPAM 1 MG/1
TABLET ORAL ONCE A DAY
Refills: 0 | Status: ACTIVE | COMMUNITY
Start: 2022-06-14

## 2022-09-14 RX ORDER — PANTOPRAZOLE 20 MG/1
TAKE ONE TABLET BY MOUTH TWICE A DAY TABLET, DELAYED RELEASE ORAL
Refills: 3 | Status: ACTIVE | COMMUNITY
Start: 2022-05-24

## 2022-09-14 RX ORDER — BISMUTH SUBSALICYLATE 262 MG
TABLET,CHEWABLE ORAL AS NEEDED
Refills: 0 | Status: ACTIVE | COMMUNITY
Start: 2022-06-14

## 2022-09-14 RX ORDER — SIMETHICONE 125 MG
1 CAPSULE AFTER MEALS AND AT BEDTIME AS NEEDED CAPSULE ORAL
Status: ACTIVE | COMMUNITY

## 2022-09-14 NOTE — HPI-HPI
Longstanding pt with ibs-c with bloating last seen 6/22 with severe pain after having chocolate with soy additive. Taking Senna 3 pills nightly and glycerin suppository prn. Using daily with only small bm but she does not eat much. Had tried IB Harini in the past but too expensive to coninue.  Complaining of constipation and bloating severe for the past 2 weeks. Only eating a very small amount. Has feeling of in fullness in rectum .  Tried Simethicone 125 mg 1-2 after meals without effec Last colon 9/19 with 3 diminutive adenomas, consider 5 year recall. To try Dicyclomine 10 mg TID prn and Dulcolax supp every other night. Labs 9/7/22 reviewed an normal. Tells me she had MRI of the brain for headaches and has a small anuerysm which is small 1-2 mm in the brain which immediately causes her to cry.  Drinks ginger ale and chammomile tea to help calm her down.  l let her tell me about when her BP was high in Dr Lilly's office, she thinks it was from a migraine headache, this resulted in trying some bp meds to which she did not tolerate and required hospitalization for hyponatremia.

## 2022-09-26 ENCOUNTER — TELEPHONE ENCOUNTER (OUTPATIENT)
Dept: URBAN - METROPOLITAN AREA CLINIC 63 | Facility: CLINIC | Age: 77
End: 2022-09-26

## 2022-10-11 ENCOUNTER — TELEPHONE ENCOUNTER (OUTPATIENT)
Dept: URBAN - METROPOLITAN AREA CLINIC 60 | Facility: CLINIC | Age: 77
End: 2022-10-11

## 2022-12-07 ENCOUNTER — OFFICE VISIT (OUTPATIENT)
Dept: URBAN - METROPOLITAN AREA CLINIC 60 | Facility: CLINIC | Age: 77
End: 2022-12-07

## 2023-01-17 ENCOUNTER — TELEPHONE ENCOUNTER (OUTPATIENT)
Dept: URBAN - METROPOLITAN AREA CLINIC 7 | Facility: CLINIC | Age: 78
End: 2023-01-17

## 2023-01-30 ENCOUNTER — TELEPHONE ENCOUNTER (OUTPATIENT)
Dept: URBAN - METROPOLITAN AREA CLINIC 7 | Facility: CLINIC | Age: 78
End: 2023-01-30

## 2023-01-30 ENCOUNTER — OFFICE VISIT (OUTPATIENT)
Dept: URBAN - METROPOLITAN AREA CLINIC 7 | Facility: CLINIC | Age: 78
End: 2023-01-30
Payer: COMMERCIAL

## 2023-01-30 ENCOUNTER — LAB OUTSIDE AN ENCOUNTER (OUTPATIENT)
Dept: URBAN - METROPOLITAN AREA CLINIC 7 | Facility: CLINIC | Age: 78
End: 2023-01-30

## 2023-01-30 VITALS
TEMPERATURE: 97.6 F | SYSTOLIC BLOOD PRESSURE: 130 MMHG | DIASTOLIC BLOOD PRESSURE: 80 MMHG | BODY MASS INDEX: 22.15 KG/M2 | HEIGHT: 63 IN | WEIGHT: 125 LBS

## 2023-01-30 DIAGNOSIS — R11.0 NAUSEA: ICD-10-CM

## 2023-01-30 DIAGNOSIS — R63.4 ABNORMAL WEIGHT LOSS: ICD-10-CM

## 2023-01-30 DIAGNOSIS — Z86.010 HISTORY OF COLON POLYPS: ICD-10-CM

## 2023-01-30 DIAGNOSIS — K31.7 GASTRIC POLYPS: ICD-10-CM

## 2023-01-30 DIAGNOSIS — K76.0 HEPATIC STEATOSIS: ICD-10-CM

## 2023-01-30 DIAGNOSIS — R10.13 EPIGASTRIC PAIN: ICD-10-CM

## 2023-01-30 DIAGNOSIS — K59.09 CHRONIC CONSTIPATION: ICD-10-CM

## 2023-01-30 PROBLEM — 422587007: Status: ACTIVE | Noted: 2023-01-30

## 2023-01-30 PROBLEM — 236069009: Status: ACTIVE | Noted: 2023-01-30

## 2023-01-30 PROCEDURE — 99214 OFFICE O/P EST MOD 30 MIN: CPT | Performed by: INTERNAL MEDICINE

## 2023-01-30 PROCEDURE — 99204 OFFICE O/P NEW MOD 45 MIN: CPT | Performed by: INTERNAL MEDICINE

## 2023-01-30 RX ORDER — PANTOPRAZOLE SODIUM 40 MG/1
TABLET, DELAYED RELEASE ORAL TWICE A DAY
Refills: 0 | Status: ON HOLD | COMMUNITY
Start: 2019-12-05

## 2023-01-30 RX ORDER — CALCIUM CARBONATE 500 MG/1
TABLET, CHEWABLE ORAL AS NEEDED
Refills: 0 | Status: ON HOLD | COMMUNITY
Start: 2022-06-14

## 2023-01-30 RX ORDER — SENNA PLUS 8.6 MG/1
2 TABLETS AT BEDTIME AS NEEDED TABLET ORAL ONCE A DAY
Status: ACTIVE | COMMUNITY

## 2023-01-30 RX ORDER — LORAZEPAM 1 MG/1
TABLET ORAL ONCE A DAY
Refills: 0 | Status: ACTIVE | COMMUNITY
Start: 2022-06-14

## 2023-01-30 RX ORDER — POLYETHYLENE GLYCOL 3350, SODIUM SULFATE ANHYDROUS, SODIUM BICARBONATE, SODIUM CHLORIDE, POTASSIUM CHLORIDE 236; 22.74; 6.74; 5.86; 2.97 G/4L; G/4L; G/4L; G/4L; G/4L
AS DIRECTED POWDER, FOR SOLUTION ORAL
Qty: 1 | Refills: 1 | OUTPATIENT

## 2023-01-30 RX ORDER — SIMETHICONE 125 MG
1 CAPSULE AFTER MEALS AND AT BEDTIME AS NEEDED CAPSULE ORAL
Status: ON HOLD | COMMUNITY

## 2023-01-30 RX ORDER — BISMUTH SUBSALICYLATE 262 MG
TABLET,CHEWABLE ORAL AS NEEDED
Refills: 0 | Status: ACTIVE | COMMUNITY
Start: 2022-06-14

## 2023-01-30 RX ORDER — AMLODIPINE BESYLATE 2.5 MG/1
1 TABLET TABLET ORAL ONCE A DAY
Status: ACTIVE | COMMUNITY

## 2023-01-30 RX ORDER — PANTOPRAZOLE 20 MG/1
TAKE ONE TABLET BY MOUTH TWICE A DAY TABLET, DELAYED RELEASE ORAL
Refills: 3 | Status: ACTIVE | COMMUNITY
Start: 2022-05-24

## 2023-01-30 NOTE — HPI-TODAY'S VISIT:
Prior patient of Dr Irving and seen today for second opinion for chronic constipation and last BM days ago. Reports longstanding hx of GI sxs since time of passing of her daughter . Originally from [facility] and was living in [place] prior to moving to [place]. Hx of chronic constipation and longstanding GERD. Had egd with finding of gastric polyps for eval of epigastric pain. Now reports recurrent epigastic pain sxs with associated dyspepsia. The constipation is  chronic and reports that in the past got relief with senna but now  diminished relief. Took mirilax prn. Was admitted to hospital in january for elevated Bp and had severe exacerbation of constipation since that time. Has been advised by cardiology to take lipitor. Currently wearing cardiac monitor. Appetite is diminished and may have exacerbation of sxs postprandially. No overt bleeding. No fevers or chills. No night sweats. No weight loss. No flushing. No nausea or vomiting. No travel,sick contacts or new medications. No recent dietary changes. No excessive caffeine intake . No excessive artificial sweetener use including sorbitol and diabetic sweetened foods. No family history IBD,CRC.

## 2023-01-31 ENCOUNTER — TELEPHONE ENCOUNTER (OUTPATIENT)
Dept: URBAN - METROPOLITAN AREA CLINIC 7 | Facility: CLINIC | Age: 78
End: 2023-01-31

## 2023-02-02 ENCOUNTER — TELEPHONE ENCOUNTER (OUTPATIENT)
Dept: URBAN - METROPOLITAN AREA CLINIC 7 | Facility: CLINIC | Age: 78
End: 2023-02-02

## 2023-03-01 ENCOUNTER — LAB OUTSIDE AN ENCOUNTER (OUTPATIENT)
Dept: URBAN - METROPOLITAN AREA CLINIC 7 | Facility: CLINIC | Age: 78
End: 2023-03-01

## 2023-03-01 ENCOUNTER — OFFICE VISIT (OUTPATIENT)
Dept: URBAN - METROPOLITAN AREA CLINIC 7 | Facility: CLINIC | Age: 78
End: 2023-03-01
Payer: COMMERCIAL

## 2023-03-01 ENCOUNTER — OFFICE VISIT (OUTPATIENT)
Dept: URBAN - METROPOLITAN AREA CLINIC 7 | Facility: CLINIC | Age: 78
End: 2023-03-01

## 2023-03-01 VITALS
TEMPERATURE: 98 F | SYSTOLIC BLOOD PRESSURE: 130 MMHG | BODY MASS INDEX: 21.62 KG/M2 | DIASTOLIC BLOOD PRESSURE: 70 MMHG | HEIGHT: 63 IN | WEIGHT: 122 LBS

## 2023-03-01 DIAGNOSIS — K31.7 GASTRIC POLYPS: ICD-10-CM

## 2023-03-01 DIAGNOSIS — Z86.010 HISTORY OF COLON POLYPS: ICD-10-CM

## 2023-03-01 DIAGNOSIS — R63.4 ABNORMAL WEIGHT LOSS: ICD-10-CM

## 2023-03-01 DIAGNOSIS — R19.4 CHANGE IN BOWEL HABITS: ICD-10-CM

## 2023-03-01 DIAGNOSIS — R10.13 EPIGASTRIC PAIN: ICD-10-CM

## 2023-03-01 DIAGNOSIS — K76.0 HEPATIC STEATOSIS: ICD-10-CM

## 2023-03-01 DIAGNOSIS — Z86.19 HISTORY OF HELICOBACTER PYLORI INFECTION: ICD-10-CM

## 2023-03-01 PROBLEM — 428283002: Status: ACTIVE | Noted: 2023-01-30

## 2023-03-01 PROBLEM — 197321007: Status: ACTIVE | Noted: 2023-01-30

## 2023-03-01 PROBLEM — 78809005: Status: ACTIVE | Noted: 2023-01-30

## 2023-03-01 PROBLEM — 15627741000119108: Status: ACTIVE | Noted: 2023-03-01

## 2023-03-01 PROBLEM — 88111009: Status: ACTIVE | Noted: 2023-03-01

## 2023-03-01 PROCEDURE — 99214 OFFICE O/P EST MOD 30 MIN: CPT | Performed by: INTERNAL MEDICINE

## 2023-03-01 RX ORDER — AMLODIPINE BESYLATE 2.5 MG/1
1 TABLET TABLET ORAL ONCE A DAY
Status: ON HOLD | COMMUNITY

## 2023-03-01 RX ORDER — STANDARDIZED SENNA CONCENTRATE 8.6 MG/1
2 TABLETS AT BEDTIME AS NEEDED TABLET ORAL ONCE A DAY
Status: ACTIVE | COMMUNITY

## 2023-03-01 RX ORDER — PANTOPRAZOLE SODIUM 40 MG/1
TABLET, DELAYED RELEASE ORAL TWICE A DAY
Refills: 0 | Status: ON HOLD | COMMUNITY
Start: 2019-12-05

## 2023-03-01 RX ORDER — CALCIUM CARBONATE 500 MG/1
TABLET, CHEWABLE ORAL AS NEEDED
Refills: 0 | Status: ON HOLD | COMMUNITY
Start: 2022-06-14

## 2023-03-01 RX ORDER — SENNA PLUS 8.6 MG/1
2 TABLETS AT BEDTIME AS NEEDED TABLET ORAL ONCE A DAY
Status: ON HOLD | COMMUNITY

## 2023-03-01 RX ORDER — POLYETHYLENE GLYCOL 3350, SODIUM SULFATE ANHYDROUS, SODIUM BICARBONATE, SODIUM CHLORIDE, POTASSIUM CHLORIDE 236; 22.74; 6.74; 5.86; 2.97 G/4L; G/4L; G/4L; G/4L; G/4L
AS DIRECTED POWDER, FOR SOLUTION ORAL
Qty: 1 | Refills: 1 | Status: ON HOLD | COMMUNITY

## 2023-03-01 RX ORDER — SIMETHICONE 125 MG
1 CAPSULE AFTER MEALS AND AT BEDTIME AS NEEDED CAPSULE ORAL
Status: ON HOLD | COMMUNITY

## 2023-03-01 RX ORDER — PANTOPRAZOLE 20 MG/1
1 TABLET TABLET, DELAYED RELEASE ORAL PRN
Refills: 3 | Status: ACTIVE | COMMUNITY
Start: 2022-05-24

## 2023-03-01 RX ORDER — BISMUTH SUBSALICYLATE 262 MG
TABLET,CHEWABLE ORAL AS NEEDED
Refills: 0 | Status: ACTIVE | COMMUNITY
Start: 2022-06-14

## 2023-03-01 RX ORDER — LORAZEPAM 1 MG/1
TABLET ORAL ONCE A DAY
Refills: 0 | Status: ACTIVE | COMMUNITY
Start: 2022-06-14

## 2023-03-01 NOTE — HPI-TODAY'S VISIT:
s/p recent admission and now seen with increased constipation. Associated with change in bowel habits Requires increased laxative use. Having epigastric discomfort and dyspepsia. Family hx PUD Personal hx h pylori Eval included labs without acute finding Had MRA without acute finding. Prior CT with L1 compression deformity noted.  Prior patient of Dr Irving and last seen for second opinion for chronic constipation and last BM days ago. Reports longstanding hx of GI sxs since time of passing of her daughter . Originally from [facility] and was living in [place] prior to moving to [place]. Hx of chronic constipation and longstanding GERD. Had egd with finding of gastric polyps for eval of epigastric pain. Now reports recurrent epigastic pain sxs with associated dyspepsia. The constipation is  chronic and reports that in the past got relief with senna but now  diminished relief. Took mirilax prn. Was admitted to hospital in january for elevated Bp and had severe exacerbation of constipation since that time. Has been advised by cardiology to take lipitor. Currently wearing cardiac monitor. Appetite is diminished and may have exacerbation of sxs postprandially. No overt bleeding. No fevers or chills. No night sweats. No weight loss. No flushing. No nausea or vomiting. No travel,sick contacts or new medications. No recent dietary changes. No excessive caffeine intake . No excessive artificial sweetener use including sorbitol and diabetic sweetened foods. No family history IBD,CRC.

## 2023-03-22 ENCOUNTER — TELEPHONE ENCOUNTER (OUTPATIENT)
Dept: URBAN - METROPOLITAN AREA CLINIC 7 | Facility: CLINIC | Age: 78
End: 2023-03-22

## 2023-03-28 ENCOUNTER — TELEPHONE ENCOUNTER (OUTPATIENT)
Dept: URBAN - METROPOLITAN AREA CLINIC 7 | Facility: CLINIC | Age: 78
End: 2023-03-28

## 2023-04-06 ENCOUNTER — TELEPHONE ENCOUNTER (OUTPATIENT)
Dept: URBAN - METROPOLITAN AREA CLINIC 7 | Facility: CLINIC | Age: 78
End: 2023-04-06

## 2023-04-06 RX ORDER — AMLODIPINE BESYLATE 2.5 MG/1
1 TABLET TABLET ORAL ONCE A DAY
Status: ON HOLD | COMMUNITY

## 2023-04-06 RX ORDER — LORAZEPAM 1 MG/1
TABLET ORAL ONCE A DAY
Refills: 0 | Status: ACTIVE | COMMUNITY
Start: 2022-06-14

## 2023-04-06 RX ORDER — SENNA PLUS 8.6 MG/1
2 TABLETS AT BEDTIME AS NEEDED TABLET ORAL ONCE A DAY
Status: ON HOLD | COMMUNITY

## 2023-04-06 RX ORDER — BISMUTH SUBSALICYLATE 262 MG
TABLET,CHEWABLE ORAL AS NEEDED
Refills: 0 | Status: ACTIVE | COMMUNITY
Start: 2022-06-14

## 2023-04-06 RX ORDER — CALCIUM CARBONATE 500 MG/1
TABLET, CHEWABLE ORAL AS NEEDED
Refills: 0 | Status: ON HOLD | COMMUNITY
Start: 2022-06-14

## 2023-04-06 RX ORDER — SIMETHICONE 125 MG
1 CAPSULE AFTER MEALS AND AT BEDTIME AS NEEDED CAPSULE ORAL
Status: ON HOLD | COMMUNITY

## 2023-04-06 RX ORDER — STANDARDIZED SENNA CONCENTRATE 8.6 MG/1
2 TABLETS AT BEDTIME AS NEEDED TABLET ORAL ONCE A DAY
Status: ACTIVE | COMMUNITY

## 2023-04-06 RX ORDER — POLYETHYLENE GLYCOL 3350, SODIUM SULFATE, SODIUM CHLORIDE, POTASSIUM CHLORIDE, ASCORBIC ACID, SODIUM ASCORBATE 140-9-5.2G
140ML KIT ORAL
Qty: 1 | Refills: 0 | OUTPATIENT
Start: 2023-04-06 | End: 2023-04-07

## 2023-04-06 RX ORDER — PANTOPRAZOLE SODIUM 40 MG/1
TABLET, DELAYED RELEASE ORAL TWICE A DAY
Refills: 0 | Status: ON HOLD | COMMUNITY
Start: 2019-12-05

## 2023-04-06 RX ORDER — PANTOPRAZOLE 20 MG/1
1 TABLET TABLET, DELAYED RELEASE ORAL PRN
Refills: 3 | Status: ACTIVE | COMMUNITY
Start: 2022-05-24

## 2023-04-06 RX ORDER — POLYETHYLENE GLYCOL 3350, SODIUM SULFATE ANHYDROUS, SODIUM BICARBONATE, SODIUM CHLORIDE, POTASSIUM CHLORIDE 236; 22.74; 6.74; 5.86; 2.97 G/4L; G/4L; G/4L; G/4L; G/4L
AS DIRECTED POWDER, FOR SOLUTION ORAL
Qty: 1 | Refills: 1 | Status: ON HOLD | COMMUNITY

## 2023-04-10 ENCOUNTER — TELEPHONE ENCOUNTER (OUTPATIENT)
Dept: URBAN - METROPOLITAN AREA CLINIC 7 | Facility: CLINIC | Age: 78
End: 2023-04-10

## 2023-04-12 ENCOUNTER — OFFICE VISIT (OUTPATIENT)
Dept: URBAN - METROPOLITAN AREA SURGERY CENTER 5 | Facility: SURGERY CENTER | Age: 78
End: 2023-04-12

## 2023-04-14 ENCOUNTER — APPOINTMENT (RX ONLY)
Dept: URBAN - METROPOLITAN AREA CLINIC 333 | Facility: CLINIC | Age: 78
Setting detail: DERMATOLOGY
End: 2023-04-14

## 2023-04-14 DIAGNOSIS — L81.4 OTHER MELANIN HYPERPIGMENTATION: ICD-10-CM

## 2023-04-14 DIAGNOSIS — L82.1 OTHER SEBORRHEIC KERATOSIS: ICD-10-CM

## 2023-04-14 DIAGNOSIS — B00.1 HERPESVIRAL VESICULAR DERMATITIS: ICD-10-CM

## 2023-04-14 DIAGNOSIS — Z12.83 ENCOUNTER FOR SCREENING FOR MALIGNANT NEOPLASM OF SKIN: ICD-10-CM

## 2023-04-14 DIAGNOSIS — L29.8 OTHER PRURITUS: ICD-10-CM

## 2023-04-14 DIAGNOSIS — L29.89 OTHER PRURITUS: ICD-10-CM

## 2023-04-14 DIAGNOSIS — D22 MELANOCYTIC NEVI: ICD-10-CM

## 2023-04-14 PROBLEM — D22.5 MELANOCYTIC NEVI OF TRUNK: Status: ACTIVE | Noted: 2023-04-14

## 2023-04-14 PROCEDURE — ? OTC TREATMENT REGIMEN

## 2023-04-14 PROCEDURE — ? FULL BODY SKIN EXAM

## 2023-04-14 PROCEDURE — ? TREATMENT REGIMEN

## 2023-04-14 PROCEDURE — ? PRESCRIPTION

## 2023-04-14 PROCEDURE — 99214 OFFICE O/P EST MOD 30 MIN: CPT

## 2023-04-14 PROCEDURE — ? ADDITIONAL NOTES

## 2023-04-14 PROCEDURE — ? COUNSELING

## 2023-04-14 RX ORDER — ACYCLOVIR 50 MG/G
1 CREAM TOPICAL
Qty: 5 | Refills: 3 | Status: CANCELLED

## 2023-04-14 ASSESSMENT — LOCATION DETAILED DESCRIPTION DERM
LOCATION DETAILED: INFERIOR THORACIC SPINE
LOCATION DETAILED: RIGHT INFERIOR MEDIAL UPPER BACK
LOCATION DETAILED: RIGHT SUPERIOR MEDIAL UPPER BACK
LOCATION DETAILED: UPPER STERNUM
LOCATION DETAILED: RIGHT BUTTOCK

## 2023-04-14 ASSESSMENT — LOCATION ZONE DERM: LOCATION ZONE: TRUNK

## 2023-04-14 ASSESSMENT — LOCATION SIMPLE DESCRIPTION DERM
LOCATION SIMPLE: CHEST
LOCATION SIMPLE: RIGHT BUTTOCK
LOCATION SIMPLE: RIGHT UPPER BACK
LOCATION SIMPLE: UPPER BACK

## 2023-04-14 NOTE — HPI: EVALUATION OF SKIN LESION(S)
Hpi Title: Evaluation of Skin Lesions
Additional History: \\nCheck rash on itchy skin, states overall body. States skin is very dry, states itching on and off present for about a year.

## 2023-04-14 NOTE — PROCEDURE: ADDITIONAL NOTES
Render Risk Assessment In Note?: no
Additional Notes: Advised patient she can continue chamomile lotion, since the prescription is not covered and too expensive
Detail Level: Simple

## 2023-04-14 NOTE — PROCEDURE: OTC TREATMENT REGIMEN
Detail Level: Zone
Patient Specific Otc Recommendations (Will Not Stick From Patient To Patient): Sarna anti itch

## 2023-05-05 ENCOUNTER — TELEPHONE ENCOUNTER (OUTPATIENT)
Dept: URBAN - METROPOLITAN AREA CLINIC 7 | Facility: CLINIC | Age: 78
End: 2023-05-05

## 2023-05-31 ENCOUNTER — TELEPHONE ENCOUNTER (OUTPATIENT)
Dept: URBAN - METROPOLITAN AREA CLINIC 7 | Facility: CLINIC | Age: 78
End: 2023-05-31

## 2023-06-13 ENCOUNTER — TELEPHONE ENCOUNTER (OUTPATIENT)
Dept: URBAN - METROPOLITAN AREA CLINIC 63 | Facility: CLINIC | Age: 78
End: 2023-06-13

## 2023-06-14 ENCOUNTER — TELEPHONE ENCOUNTER (OUTPATIENT)
Dept: URBAN - METROPOLITAN AREA CLINIC 7 | Facility: CLINIC | Age: 78
End: 2023-06-14

## 2023-06-14 ENCOUNTER — OFFICE VISIT (OUTPATIENT)
Dept: URBAN - METROPOLITAN AREA CLINIC 63 | Facility: CLINIC | Age: 78
End: 2023-06-14

## 2023-06-16 ENCOUNTER — OFFICE VISIT (OUTPATIENT)
Dept: URBAN - METROPOLITAN AREA CLINIC 60 | Facility: CLINIC | Age: 78
End: 2023-06-16
Payer: COMMERCIAL

## 2023-06-16 ENCOUNTER — LAB OUTSIDE AN ENCOUNTER (OUTPATIENT)
Dept: URBAN - METROPOLITAN AREA CLINIC 60 | Facility: CLINIC | Age: 78
End: 2023-06-16

## 2023-06-16 VITALS
SYSTOLIC BLOOD PRESSURE: 122 MMHG | RESPIRATION RATE: 12 BRPM | DIASTOLIC BLOOD PRESSURE: 64 MMHG | TEMPERATURE: 97.5 F | HEART RATE: 66 BPM | OXYGEN SATURATION: 99 % | HEIGHT: 63 IN | BODY MASS INDEX: 21.05 KG/M2 | WEIGHT: 118.8 LBS

## 2023-06-16 DIAGNOSIS — K59.09 CHRONIC CONSTIPATION: ICD-10-CM

## 2023-06-16 DIAGNOSIS — R10.13 EPIGASTRIC PAIN: ICD-10-CM

## 2023-06-16 PROCEDURE — 99214 OFFICE O/P EST MOD 30 MIN: CPT | Performed by: PHYSICIAN ASSISTANT

## 2023-06-16 RX ORDER — BISMUTH SUBSALICYLATE 262 MG
TABLET,CHEWABLE ORAL AS NEEDED
Refills: 0 | Status: ACTIVE | COMMUNITY
Start: 2022-06-14

## 2023-06-16 RX ORDER — PANTOPRAZOLE 20 MG/1
1 TABLET TABLET, DELAYED RELEASE ORAL PRN
Refills: 3 | Status: ACTIVE | COMMUNITY
Start: 2022-05-24

## 2023-06-16 RX ORDER — CALCIUM CARBONATE 500 MG/1
TABLET, CHEWABLE ORAL AS NEEDED
Refills: 0 | Status: DISCONTINUED | COMMUNITY
Start: 2022-06-14

## 2023-06-16 RX ORDER — SENNA PLUS 8.6 MG/1
2 TABLETS AT BEDTIME AS NEEDED TABLET ORAL ONCE A DAY
Status: DISCONTINUED | COMMUNITY

## 2023-06-16 RX ORDER — LORAZEPAM 1 MG/1
TABLET ORAL ONCE A DAY
Refills: 0 | Status: ACTIVE | COMMUNITY
Start: 2022-06-14

## 2023-06-16 RX ORDER — PANTOPRAZOLE SODIUM 40 MG/1
TABLET, DELAYED RELEASE ORAL TWICE A DAY
Refills: 0 | Status: DISCONTINUED | COMMUNITY
Start: 2019-12-05

## 2023-06-16 RX ORDER — AMLODIPINE BESYLATE 2.5 MG/1
1 TABLET TABLET ORAL ONCE A DAY
Status: DISCONTINUED | COMMUNITY

## 2023-06-16 RX ORDER — SIMETHICONE 125 MG
1 CAPSULE AFTER MEALS AND AT BEDTIME AS NEEDED CAPSULE ORAL
Status: ACTIVE | COMMUNITY

## 2023-06-16 RX ORDER — STANDARDIZED SENNA CONCENTRATE 8.6 MG/1
2 TABLETS AT BEDTIME AS NEEDED TABLET ORAL ONCE A DAY
Status: DISCONTINUED | COMMUNITY

## 2023-06-16 NOTE — HPI-TODAY'S VISIT:
77-year-old female with dyslipidemia, hypertension, chronic pain, PILAR, panic disorder, sciatica, constipation, previous H. pylori 16 years ago and chronic GERD presents to the office as a new patient for evaluation of abdominal pain. She has a history of chronic abdominal pain and has been seen in our office in the past by Dr. Holder, Dr. Martinez and also Dr. Irving.  She has also seen numerous GI specialists outside of our office.  She was last seen in our office in January 2022.  For constipation she has tried MiraLAX, fiber, Dulcolax, senna, Linzess, Amitiza, Motegrity, lactulose which either did not help or caused bothersome side effects.  She has taken Bentyl in the past with some success but it caused shortness of breath several hours after taking it. She has been in the ER multiple times for various pains.  She has been seen by cardiology for chest pain and has had negative cardiac work-up. [place] was [place] [facility] [place], [place].  Her admitting complaint was chest and leg pain.  She was seen by cardiology and was ruled out for ACS.  Anxiety was thought to be contributing to her chest pain.  She was seen by [facility] gastroenterology reporting stabbing epigastric pain.  She was using Pepto-Bismol at home with some relief.  She denied any NSAID use.  She was using milk of magnesia for her constipation with some success.  CT abdomen/pelvis without contrast showed no acute abnormalities. EGD by [facility] gastroenterology 4/25/2023:Erythema and some slight cobblestoning of the gastric mucosa.  Hill grade 1 anatomy of the cardia without gastric varices.  No masses or tumors in the stomach.  Normal duodenum.  Normal esophagus.  Gastric biopsy was negative for H. pylori.  Labs were unremarkable.  She was in the ER on June 14, 2023 with a chief complaint of diffuse abdominal pain and gas pain.  She was constipated but then started taking Dulcolax and then had diarrhea.  She is using peptobismol on a demand basis.   Her labs were unremarkable except for low sodium of 128 CT scan of the abdomen and pelvis without contrast showed no acute findings.  She was prescribed simethicone.  She was given IV saline for hyponatremia which was thought to be due to laxative use.  She was discharged and was advised to follow-up with GI.  She follows up in the office today with complaints of constipation. She has been using MOM and developed diarrhea. Uses pepto bismol when she has diarrhea which works well. States she has chronic abdominal pain. Uses dicyclomine as needed which is helpful. Pain is located in the epigastrium. She takes pantoprazole 20mg per day. She reports poor appetite and does not eat much due to her epigastric pain. She is anxious and at times is tearful throughout the visit.     Colonoscopy 9/26/2019:1 small benign polyp removed from the cecum.  1 small tubular adenoma removed from the transverse colon.  1 small tubular adenoma removed from the descending colon.  1 small tubular adenoma removed from the sigmoid colon.  Melanosis coli.  Sigmoid diverticulosis.  Internal hemorrhoids. Prior imaging studies: CT abdomen/pelvis without contrast 6/14/2023:No acute abnormality. Prior imaging studies: MRA abdomen without contrast February 15, 2023:No acute abnormality.  Aortomesenteric angle measured 62 degrees.  Aortomesenteric distance measured 13 mm. Prior imaging studies: Nuclear gastric emptying scan March 25, 2022:Normal.

## 2023-06-21 ENCOUNTER — OFFICE VISIT (OUTPATIENT)
Dept: URBAN - METROPOLITAN AREA CLINIC 60 | Facility: CLINIC | Age: 78
End: 2023-06-21

## 2023-06-21 RX ORDER — AMLODIPINE BESYLATE 2.5 MG/1
1 TABLET TABLET ORAL ONCE A DAY
Status: ACTIVE | COMMUNITY

## 2023-06-21 RX ORDER — CALCIUM CARBONATE 500 MG/1
TABLET, CHEWABLE ORAL AS NEEDED
Refills: 0 | Status: ACTIVE | COMMUNITY
Start: 2022-06-14

## 2023-06-21 RX ORDER — PANTOPRAZOLE SODIUM 40 MG/1
TABLET, DELAYED RELEASE ORAL TWICE A DAY
Refills: 0 | Status: ACTIVE | COMMUNITY
Start: 2019-12-05

## 2023-06-21 RX ORDER — SIMETHICONE 125 MG
1 CAPSULE AFTER MEALS AND AT BEDTIME AS NEEDED CAPSULE ORAL
Status: ACTIVE | COMMUNITY

## 2023-06-21 RX ORDER — BISMUTH SUBSALICYLATE 262 MG
TABLET,CHEWABLE ORAL AS NEEDED
Refills: 0 | Status: ACTIVE | COMMUNITY
Start: 2022-06-14

## 2023-06-21 RX ORDER — PANTOPRAZOLE 20 MG/1
1 TABLET TABLET, DELAYED RELEASE ORAL PRN
Refills: 3 | Status: ACTIVE | COMMUNITY
Start: 2022-05-24

## 2023-06-21 RX ORDER — LORAZEPAM 1 MG/1
TABLET ORAL ONCE A DAY
Refills: 0 | Status: ACTIVE | COMMUNITY
Start: 2022-06-14

## 2023-06-21 RX ORDER — STANDARDIZED SENNA CONCENTRATE 8.6 MG/1
2 TABLETS AT BEDTIME AS NEEDED TABLET ORAL ONCE A DAY
Status: ACTIVE | COMMUNITY

## 2023-06-21 RX ORDER — SENNA PLUS 8.6 MG/1
2 TABLETS AT BEDTIME AS NEEDED TABLET ORAL ONCE A DAY
Status: ACTIVE | COMMUNITY

## 2023-06-22 ENCOUNTER — TELEPHONE ENCOUNTER (OUTPATIENT)
Dept: URBAN - METROPOLITAN AREA CLINIC 64 | Facility: CLINIC | Age: 78
End: 2023-06-22

## 2023-07-06 ENCOUNTER — OFFICE VISIT (OUTPATIENT)
Dept: URBAN - METROPOLITAN AREA CLINIC 63 | Facility: CLINIC | Age: 78
End: 2023-07-06
Payer: COMMERCIAL

## 2023-07-06 ENCOUNTER — WEB ENCOUNTER (OUTPATIENT)
Dept: URBAN - METROPOLITAN AREA CLINIC 63 | Facility: CLINIC | Age: 78
End: 2023-07-06

## 2023-07-06 VITALS
TEMPERATURE: 97.1 F | SYSTOLIC BLOOD PRESSURE: 118 MMHG | OXYGEN SATURATION: 99 % | WEIGHT: 120.8 LBS | DIASTOLIC BLOOD PRESSURE: 74 MMHG | BODY MASS INDEX: 21.4 KG/M2 | HEIGHT: 63 IN | HEART RATE: 68 BPM

## 2023-07-06 DIAGNOSIS — K21.9 GASTROESOPHAGEAL REFLUX DISEASE, UNSPECIFIED WHETHER ESOPHAGITIS PRESENT: ICD-10-CM

## 2023-07-06 DIAGNOSIS — K63.5 POLYP OF COLON, UNSPECIFIED PART OF COLON, UNSPECIFIED TYPE: ICD-10-CM

## 2023-07-06 DIAGNOSIS — K59.00 CONSTIPATION, UNSPECIFIED CONSTIPATION TYPE: ICD-10-CM

## 2023-07-06 DIAGNOSIS — R10.84 GENERALIZED ABDOMINAL PAIN: ICD-10-CM

## 2023-07-06 PROBLEM — 14760008: Status: ACTIVE | Noted: 2023-07-06

## 2023-07-06 PROCEDURE — 99214 OFFICE O/P EST MOD 30 MIN: CPT | Performed by: INTERNAL MEDICINE

## 2023-07-06 RX ORDER — LINACLOTIDE 72 UG/1
1 CAPSULE AT LEAST 30 MINUTES BEFORE THE FIRST MEAL OF THE DAY ON AN EMPTY STOMACH CAPSULE, GELATIN COATED ORAL ONCE A DAY
Qty: 30 | Refills: 1 | OUTPATIENT
Start: 2023-07-06 | End: 2023-09-04

## 2023-07-06 RX ORDER — LORAZEPAM 1 MG/1
TABLET ORAL ONCE A DAY
Refills: 0 | Status: ACTIVE | COMMUNITY
Start: 2022-06-14

## 2023-07-06 RX ORDER — PANTOPRAZOLE SODIUM 40 MG/1
TABLET, DELAYED RELEASE ORAL TWICE A DAY
Refills: 0 | Status: ACTIVE | COMMUNITY
Start: 2019-12-05

## 2023-07-06 RX ORDER — STANDARDIZED SENNA CONCENTRATE 8.6 MG/1
2 TABLETS AT BEDTIME AS NEEDED TABLET ORAL ONCE A DAY
Status: ACTIVE | COMMUNITY

## 2023-07-06 RX ORDER — CALCIUM CARBONATE 500 MG/1
TABLET, CHEWABLE ORAL AS NEEDED
Refills: 0 | Status: ACTIVE | COMMUNITY
Start: 2022-06-14

## 2023-07-06 RX ORDER — SENNA PLUS 8.6 MG/1
2 TABLETS AT BEDTIME AS NEEDED TABLET ORAL ONCE A DAY
Status: ACTIVE | COMMUNITY

## 2023-07-06 RX ORDER — BISMUTH SUBSALICYLATE 262 MG
TABLET,CHEWABLE ORAL AS NEEDED
Refills: 0 | Status: ACTIVE | COMMUNITY
Start: 2022-06-14

## 2023-07-06 RX ORDER — PANTOPRAZOLE 20 MG/1
1 TABLET TABLET, DELAYED RELEASE ORAL PRN
Refills: 3 | Status: ACTIVE | COMMUNITY
Start: 2022-05-24

## 2023-07-06 RX ORDER — AMLODIPINE BESYLATE 2.5 MG/1
1 TABLET TABLET ORAL ONCE A DAY
Status: ACTIVE | COMMUNITY

## 2023-07-06 RX ORDER — SIMETHICONE 125 MG
1 CAPSULE AFTER MEALS AND AT BEDTIME AS NEEDED CAPSULE ORAL
Status: ACTIVE | COMMUNITY

## 2023-07-06 NOTE — HPI-TODAY'S VISIT:
Alvina is a 77-year-old female who presents today for follow-up.  Last seen in our office 06/2023.  At that time she was complaining of abdominal pain and constipation.  She has had chronic abdominal pain and constipation.  She has been seen in our office in the past by several different physicians.  She has also been seen by several GI specialist outside of our office.  With regards to her constipation, she has tried various over-the-counter laxatives including MiraLAX, Dulcolax, and Senokot.  She has also tried various prescription laxatives including Linzess, Amitiza, Motegrity, and lactulose.  She says that she has not tolerated these medications well due to side effects.  With regards to her abdominal pain, she has been evaluated in the emergency several different times.  Most recently, she was evaluated in the emergency room 06/2023.  CT scan was unremarkable at that time.  Labs were unremarkable as well except for a low sodium of 128.  Her last colonoscopy was 09/2019.  Prior to this, she had MRA of the abdomen 02/2023.  This was unremarkable except for a aortomesenteric angle measuring 62 degrees and suggestion of possible median arcuate ligament syndrome.  Due to this, mesenteric Doppler was ordered which she had done 06/2023.  This was unremarkable.  Colonoscopy revealed several small colon polyps, diverticulosis in the sigmoid colon, melanosis coli, and internal hemorrhoids.

## 2023-07-07 ENCOUNTER — TELEPHONE ENCOUNTER (OUTPATIENT)
Dept: URBAN - METROPOLITAN AREA CLINIC 60 | Facility: CLINIC | Age: 78
End: 2023-07-07

## 2023-07-11 ENCOUNTER — TELEPHONE ENCOUNTER (OUTPATIENT)
Dept: URBAN - METROPOLITAN AREA CLINIC 60 | Facility: CLINIC | Age: 78
End: 2023-07-11

## 2023-07-13 ENCOUNTER — OFFICE VISIT (OUTPATIENT)
Dept: URBAN - METROPOLITAN AREA TELEHEALTH 1 | Facility: TELEHEALTH | Age: 78
End: 2023-07-13

## 2023-07-13 ENCOUNTER — P2P PATIENT RECORD (OUTPATIENT)
Age: 78
End: 2023-07-13

## 2023-07-16 PROBLEM — 235595009: Status: ACTIVE | Noted: 2023-07-16

## 2023-07-20 ENCOUNTER — OFFICE VISIT (OUTPATIENT)
Dept: URBAN - METROPOLITAN AREA CLINIC 63 | Facility: CLINIC | Age: 78
End: 2023-07-20

## 2023-07-27 ENCOUNTER — TELEPHONE ENCOUNTER (OUTPATIENT)
Dept: URBAN - METROPOLITAN AREA CLINIC 7 | Facility: CLINIC | Age: 78
End: 2023-07-27

## 2023-09-06 ENCOUNTER — OFFICE VISIT (OUTPATIENT)
Dept: URBAN - METROPOLITAN AREA CLINIC 60 | Facility: CLINIC | Age: 78
End: 2023-09-06
Payer: COMMERCIAL

## 2023-09-06 ENCOUNTER — LAB OUTSIDE AN ENCOUNTER (OUTPATIENT)
Dept: URBAN - METROPOLITAN AREA CLINIC 60 | Facility: CLINIC | Age: 78
End: 2023-09-06

## 2023-09-06 ENCOUNTER — WEB ENCOUNTER (OUTPATIENT)
Dept: URBAN - METROPOLITAN AREA CLINIC 60 | Facility: CLINIC | Age: 78
End: 2023-09-06

## 2023-09-06 VITALS
HEART RATE: 68 BPM | BODY MASS INDEX: 20.8 KG/M2 | WEIGHT: 117.4 LBS | OXYGEN SATURATION: 97 % | TEMPERATURE: 97.8 F | RESPIRATION RATE: 12 BRPM | DIASTOLIC BLOOD PRESSURE: 66 MMHG | SYSTOLIC BLOOD PRESSURE: 130 MMHG | HEIGHT: 63 IN

## 2023-09-06 DIAGNOSIS — R10.84 GENERALIZED ABDOMINAL PAIN: ICD-10-CM

## 2023-09-06 DIAGNOSIS — K59.00 CONSTIPATION, UNSPECIFIED CONSTIPATION TYPE: ICD-10-CM

## 2023-09-06 PROCEDURE — 99214 OFFICE O/P EST MOD 30 MIN: CPT | Performed by: INTERNAL MEDICINE

## 2023-09-06 PROCEDURE — 99204 OFFICE O/P NEW MOD 45 MIN: CPT | Performed by: INTERNAL MEDICINE

## 2023-09-06 RX ORDER — PANTOPRAZOLE 20 MG/1
1 TABLET TABLET, DELAYED RELEASE ORAL PRN
Refills: 3 | Status: ACTIVE | COMMUNITY
Start: 2022-05-24

## 2023-09-06 RX ORDER — STANDARDIZED SENNA CONCENTRATE 8.6 MG/1
2 TABLETS AT BEDTIME AS NEEDED TABLET ORAL ONCE A DAY
Status: ACTIVE | COMMUNITY

## 2023-09-06 RX ORDER — PANTOPRAZOLE SODIUM 40 MG/1
TABLET, DELAYED RELEASE ORAL TWICE A DAY
Refills: 0 | Status: ACTIVE | COMMUNITY
Start: 2019-12-05

## 2023-09-06 RX ORDER — CALCIUM CARBONATE 500 MG/1
TABLET, CHEWABLE ORAL AS NEEDED
Refills: 0 | Status: ACTIVE | COMMUNITY
Start: 2022-06-14

## 2023-09-06 RX ORDER — SENNA PLUS 8.6 MG/1
2 TABLETS AT BEDTIME AS NEEDED TABLET ORAL ONCE A DAY
Status: ACTIVE | COMMUNITY

## 2023-09-06 RX ORDER — BISMUTH SUBSALICYLATE 262 MG
TABLET,CHEWABLE ORAL AS NEEDED
Refills: 0 | Status: ACTIVE | COMMUNITY
Start: 2022-06-14

## 2023-09-06 RX ORDER — SIMETHICONE 125 MG
1 CAPSULE AFTER MEALS AND AT BEDTIME AS NEEDED CAPSULE ORAL
Status: ACTIVE | COMMUNITY

## 2023-09-06 RX ORDER — AMLODIPINE BESYLATE 2.5 MG/1
1 TABLET TABLET ORAL ONCE A DAY
Status: ACTIVE | COMMUNITY

## 2023-09-06 RX ORDER — LORAZEPAM 1 MG/1
TABLET ORAL ONCE A DAY
Refills: 0 | Status: ACTIVE | COMMUNITY
Start: 2022-06-14

## 2023-09-06 NOTE — HPI-TODAY'S VISIT:
Alvina is a 77-year-old female who presents today for follow-up. She continues to complain of constipation and abdominal pain. These have both been chronic complaints. She has been seen in our office in the past by several different physicians.  She has also been seen by several GI specialist outside of our office.  With regards to her constipation, she has tried various over-the-counter laxatives including MiraLAX, Dulcolax, and Senokot.  She has also tried various prescription laxatives including Linzess, Amitiza, Motegrity, and lactulose.  She says that she has not tolerated these medications well due to side effects. She thinks that she had pain due to MIralax which caused her to go to the hospital. She said that she had some relief with Senna tea. With regards to her abdominal pain, she has been evaluated in the emergency several different times. Labs were unremarkable except for hyponatermia. She had MRA of the abdomen 02/2023.  This was unremarkable except for a aortomesenteric angle measuring 62 degrees and suggestion of possible median arcuate ligament syndrome.  Due to this, mesenteric Doppler was ordered which she had done 06/2023.  This was unremarkable. Her last colonoscopy was 02/2029. Colonoscopy revealed several small colon polyps, diverticulosis in the sigmoid colon, melanosis coli, and internal hemorrhoids.

## 2023-09-07 ENCOUNTER — TELEPHONE ENCOUNTER (OUTPATIENT)
Dept: URBAN - METROPOLITAN AREA CLINIC 63 | Facility: CLINIC | Age: 78
End: 2023-09-07

## 2023-09-07 RX ORDER — STANDARDIZED SENNA CONCENTRATE 8.6 MG/1
2 TABLETS AT BEDTIME AS NEEDED TABLET ORAL ONCE A DAY
Status: ACTIVE | COMMUNITY

## 2023-09-07 RX ORDER — CALCIUM CARBONATE 500 MG/1
TABLET, CHEWABLE ORAL AS NEEDED
Refills: 0 | Status: ACTIVE | COMMUNITY
Start: 2022-06-14

## 2023-09-07 RX ORDER — PANTOPRAZOLE SODIUM 40 MG/1
TABLET, DELAYED RELEASE ORAL TWICE A DAY
Refills: 0 | Status: ACTIVE | COMMUNITY
Start: 2019-12-05

## 2023-09-07 RX ORDER — PANTOPRAZOLE 20 MG/1
1 TABLET TABLET, DELAYED RELEASE ORAL PRN
Refills: 3 | Status: ACTIVE | COMMUNITY
Start: 2022-05-24

## 2023-09-07 RX ORDER — LORAZEPAM 1 MG/1
TABLET ORAL ONCE A DAY
Refills: 0 | Status: ACTIVE | COMMUNITY
Start: 2022-06-14

## 2023-09-07 RX ORDER — AMLODIPINE BESYLATE 2.5 MG/1
1 TABLET TABLET ORAL ONCE A DAY
Status: ACTIVE | COMMUNITY

## 2023-09-07 RX ORDER — SIMETHICONE 125 MG
1 CAPSULE AFTER MEALS AND AT BEDTIME AS NEEDED CAPSULE ORAL
Status: ACTIVE | COMMUNITY

## 2023-09-07 RX ORDER — SENNA PLUS 8.6 MG/1
2 TABLETS AT BEDTIME AS NEEDED TABLET ORAL ONCE A DAY
Status: ACTIVE | COMMUNITY

## 2023-09-07 RX ORDER — BISMUTH SUBSALICYLATE 262 MG
TABLET,CHEWABLE ORAL AS NEEDED
Refills: 0 | Status: ACTIVE | COMMUNITY
Start: 2022-06-14

## 2023-09-07 RX ORDER — DICYCLOMINE HYDROCHLORIDE 10 MG/1
1 CAPSULE CAPSULE ORAL
Qty: 60 | Refills: 3 | OUTPATIENT
Start: 2023-09-08 | End: 2023-10-08

## 2023-09-08 PROBLEM — 10743008: Status: ACTIVE | Noted: 2023-09-08

## 2023-09-13 ENCOUNTER — TELEPHONE ENCOUNTER (OUTPATIENT)
Dept: URBAN - METROPOLITAN AREA CLINIC 64 | Facility: CLINIC | Age: 78
End: 2023-09-13

## 2023-09-13 ENCOUNTER — LAB OUTSIDE AN ENCOUNTER (OUTPATIENT)
Dept: URBAN - METROPOLITAN AREA CLINIC 64 | Facility: CLINIC | Age: 78
End: 2023-09-13

## 2023-09-14 ENCOUNTER — TELEPHONE ENCOUNTER (OUTPATIENT)
Dept: URBAN - METROPOLITAN AREA CLINIC 63 | Facility: CLINIC | Age: 78
End: 2023-09-14

## 2023-10-04 ENCOUNTER — OFFICE VISIT (OUTPATIENT)
Dept: URBAN - METROPOLITAN AREA CLINIC 60 | Facility: CLINIC | Age: 78
End: 2023-10-04

## 2023-10-06 ENCOUNTER — OUT OF OFFICE VISIT (OUTPATIENT)
Dept: URBAN - METROPOLITAN AREA SURGERY CENTER 4 | Facility: SURGERY CENTER | Age: 78
End: 2023-10-06
Payer: COMMERCIAL

## 2023-10-06 ENCOUNTER — CLAIMS CREATED FROM THE CLAIM WINDOW (OUTPATIENT)
Dept: URBAN - METROPOLITAN AREA CLINIC 4 | Facility: CLINIC | Age: 78
End: 2023-10-06
Payer: COMMERCIAL

## 2023-10-06 DIAGNOSIS — K59.01 CONSTIPATION: ICD-10-CM

## 2023-10-06 DIAGNOSIS — K63.89 MELANOSIS OF COLON: ICD-10-CM

## 2023-10-06 DIAGNOSIS — K57.30 DIVERTICULOSIS OF LARGE INTESTINE WITHOUT PERFORATION OR ABSCESS WITHOUT BLEEDING: ICD-10-CM

## 2023-10-06 DIAGNOSIS — K63.89 OTHER SPECIFIED DISEASES OF INTESTINE: ICD-10-CM

## 2023-10-06 DIAGNOSIS — R10.84 GENERALIZED ABDOMINAL PAIN: ICD-10-CM

## 2023-10-06 DIAGNOSIS — K64.0 FIRST DEGREE HEMORRHOIDS: ICD-10-CM

## 2023-10-06 DIAGNOSIS — K57.30 DIVERTICULA, COLON: ICD-10-CM

## 2023-10-06 PROBLEM — 724538004: Status: ACTIVE | Noted: 2023-10-06

## 2023-10-06 PROCEDURE — 88305 TISSUE EXAM BY PATHOLOGIST: CPT | Performed by: PATHOLOGY

## 2023-10-06 PROCEDURE — 88342 IMHCHEM/IMCYTCHM 1ST ANTB: CPT | Performed by: PATHOLOGY

## 2023-10-06 PROCEDURE — 45380 COLONOSCOPY AND BIOPSY: CPT | Performed by: INTERNAL MEDICINE

## 2023-10-06 PROCEDURE — 88313 SPECIAL STAINS GROUP 2: CPT | Performed by: PATHOLOGY

## 2023-10-06 PROCEDURE — 45380 COLONOSCOPY AND BIOPSY: CPT | Performed by: CLINIC/CENTER

## 2023-10-06 PROCEDURE — 00811 ANES LWR INTST NDSC NOS: CPT | Performed by: NURSE ANESTHETIST, CERTIFIED REGISTERED

## 2023-10-06 RX ORDER — CALCIUM CARBONATE 500 MG/1
TABLET, CHEWABLE ORAL AS NEEDED
Refills: 0 | Status: ACTIVE | COMMUNITY
Start: 2022-06-14

## 2023-10-06 RX ORDER — AMLODIPINE BESYLATE 2.5 MG/1
1 TABLET TABLET ORAL ONCE A DAY
Status: ACTIVE | COMMUNITY

## 2023-10-06 RX ORDER — SENNA PLUS 8.6 MG/1
2 TABLETS AT BEDTIME AS NEEDED TABLET ORAL ONCE A DAY
Status: ACTIVE | COMMUNITY

## 2023-10-06 RX ORDER — DICYCLOMINE HYDROCHLORIDE 10 MG/1
1 CAPSULE CAPSULE ORAL
Qty: 60 | Refills: 3 | Status: ACTIVE | COMMUNITY
Start: 2023-09-08 | End: 2023-10-08

## 2023-10-06 RX ORDER — LORAZEPAM 1 MG/1
TABLET ORAL ONCE A DAY
Refills: 0 | Status: ACTIVE | COMMUNITY
Start: 2022-06-14

## 2023-10-06 RX ORDER — BISMUTH SUBSALICYLATE 262 MG
TABLET,CHEWABLE ORAL AS NEEDED
Refills: 0 | Status: ACTIVE | COMMUNITY
Start: 2022-06-14

## 2023-10-06 RX ORDER — SIMETHICONE 125 MG
1 CAPSULE AFTER MEALS AND AT BEDTIME AS NEEDED CAPSULE ORAL
Status: ACTIVE | COMMUNITY

## 2023-10-06 RX ORDER — PANTOPRAZOLE SODIUM 40 MG/1
TABLET, DELAYED RELEASE ORAL TWICE A DAY
Refills: 0 | Status: ACTIVE | COMMUNITY
Start: 2019-12-05

## 2023-10-06 RX ORDER — PANTOPRAZOLE 20 MG/1
1 TABLET TABLET, DELAYED RELEASE ORAL PRN
Refills: 3 | Status: ACTIVE | COMMUNITY
Start: 2022-05-24

## 2023-10-06 RX ORDER — STANDARDIZED SENNA CONCENTRATE 8.6 MG/1
2 TABLETS AT BEDTIME AS NEEDED TABLET ORAL ONCE A DAY
Status: ACTIVE | COMMUNITY

## 2023-10-19 ENCOUNTER — OFFICE VISIT (OUTPATIENT)
Dept: URBAN - METROPOLITAN AREA CLINIC 63 | Facility: CLINIC | Age: 78
End: 2023-10-19

## 2023-10-27 ENCOUNTER — TELEPHONE ENCOUNTER (OUTPATIENT)
Dept: URBAN - METROPOLITAN AREA CLINIC 63 | Facility: CLINIC | Age: 78
End: 2023-10-27

## 2023-11-01 ENCOUNTER — OFFICE VISIT (OUTPATIENT)
Dept: URBAN - METROPOLITAN AREA CLINIC 60 | Facility: CLINIC | Age: 78
End: 2023-11-01

## 2023-11-24 ENCOUNTER — CLAIMS CREATED FROM THE CLAIM WINDOW (OUTPATIENT)
Dept: URBAN - METROPOLITAN AREA MEDICAL CENTER 14 | Facility: MEDICAL CENTER | Age: 78
End: 2023-11-24
Payer: COMMERCIAL

## 2023-11-24 ENCOUNTER — TELEPHONE ENCOUNTER (OUTPATIENT)
Dept: URBAN - METROPOLITAN AREA CLINIC 63 | Facility: CLINIC | Age: 78
End: 2023-11-24

## 2023-11-24 DIAGNOSIS — R10.9 ABDOMINAL PAIN: ICD-10-CM

## 2023-11-24 DIAGNOSIS — K59.00 CONSTIPATION: ICD-10-CM

## 2023-11-24 PROBLEM — 14760008: Status: ACTIVE | Noted: 2023-11-24

## 2023-11-24 PROCEDURE — 99212 OFFICE O/P EST SF 10 MIN: CPT | Performed by: NURSE PRACTITIONER

## 2023-11-28 ENCOUNTER — TELEPHONE ENCOUNTER (OUTPATIENT)
Dept: URBAN - METROPOLITAN AREA CLINIC 63 | Facility: CLINIC | Age: 78
End: 2023-11-28

## 2023-11-30 ENCOUNTER — OFFICE VISIT (OUTPATIENT)
Dept: URBAN - METROPOLITAN AREA CLINIC 63 | Facility: CLINIC | Age: 78
End: 2023-11-30
Payer: COMMERCIAL

## 2023-11-30 VITALS
HEART RATE: 84 BPM | BODY MASS INDEX: 20.2 KG/M2 | DIASTOLIC BLOOD PRESSURE: 76 MMHG | TEMPERATURE: 96.6 F | HEIGHT: 63 IN | SYSTOLIC BLOOD PRESSURE: 122 MMHG | WEIGHT: 114 LBS | OXYGEN SATURATION: 99 %

## 2023-11-30 DIAGNOSIS — R10.84 GENERALIZED ABDOMINAL PAIN: ICD-10-CM

## 2023-11-30 DIAGNOSIS — K59.00 CONSTIPATION, UNSPECIFIED CONSTIPATION TYPE: ICD-10-CM

## 2023-11-30 PROCEDURE — 99204 OFFICE O/P NEW MOD 45 MIN: CPT | Performed by: INTERNAL MEDICINE

## 2023-11-30 RX ORDER — AMLODIPINE BESYLATE 2.5 MG/1
1 TABLET TABLET ORAL ONCE A DAY
Status: ACTIVE | COMMUNITY

## 2023-11-30 RX ORDER — CALCIUM CARBONATE 500 MG/1
TABLET, CHEWABLE ORAL AS NEEDED
Refills: 0 | Status: ACTIVE | COMMUNITY
Start: 2022-06-14

## 2023-11-30 RX ORDER — LORAZEPAM 1 MG/1
TABLET ORAL ONCE A DAY
Refills: 0 | Status: ACTIVE | COMMUNITY
Start: 2022-06-14

## 2023-11-30 RX ORDER — SIMETHICONE 125 MG
1 CAPSULE AFTER MEALS AND AT BEDTIME AS NEEDED CAPSULE ORAL
Status: ACTIVE | COMMUNITY

## 2023-11-30 RX ORDER — BISMUTH SUBSALICYLATE 262 MG
TABLET,CHEWABLE ORAL AS NEEDED
Refills: 0 | Status: ACTIVE | COMMUNITY
Start: 2022-06-14

## 2023-11-30 RX ORDER — SENNA PLUS 8.6 MG/1
2 TABLETS AT BEDTIME AS NEEDED TABLET ORAL ONCE A DAY
Status: ACTIVE | COMMUNITY

## 2023-11-30 RX ORDER — STANDARDIZED SENNA CONCENTRATE 8.6 MG/1
2 TABLETS AT BEDTIME AS NEEDED TABLET ORAL ONCE A DAY
Status: ACTIVE | COMMUNITY

## 2023-11-30 RX ORDER — PANTOPRAZOLE SODIUM 40 MG/1
TABLET, DELAYED RELEASE ORAL TWICE A DAY
Refills: 0 | Status: ACTIVE | COMMUNITY
Start: 2019-12-05

## 2023-11-30 RX ORDER — PANTOPRAZOLE 20 MG/1
1 TABLET TABLET, DELAYED RELEASE ORAL PRN
Refills: 3 | Status: ACTIVE | COMMUNITY
Start: 2022-05-24

## 2023-11-30 NOTE — HPI-TODAY'S VISIT:
Alvina is a 78-year-old female who presents today for follow-up. Her main complaints have been constipation and abdominal pain. These have both been chronic complaints. She has been seen in our office in the past by several different physicians.  She has also been seen by several GI specialist outside of our office. She has had several recent hospital visits. With regards to her constipation, she has tried various over-the-counter laxatives including MiraLAX, Dulcolax, and Senokot.  She has also tried various prescription laxatives including Linzess, Amitiza, Motegrity, and Lactulose.  She says that she has not tolerated these medications well due to side effects. With regards to her abdominal pain, she has been evaluated in the emergency several different times. She had MRA of the abdomen 02/2023.  This was unremarkable except for a aortomesenteric angle measuring 62 degrees and suggestion of possible median arcuate ligament syndrome.  Due to this, mesenteric Doppler was ordered which she had done 06/2023.  This was unremarkable. Other imaging has been unremarkable. These findings are summarized below. Her last colonoscopy was 10/06/2023. Colonoscopy revealed diverticulosis in the sigmoid colon, melanosis coli, and internal hemorrhoids. HIDA scan 11/20/2023:Normal scan.  Gallbladder ejection fraction 88%.  No evidence of gallbladder dyskinesia. CT abdomen and pelvis without contrast 11/23/2023:Diverticulosis without evidence of diverticulitis.  Otherwise unremarkable CT scan. Pelvic ultrasound 10/2010/2023:Pelvic ultrasound is within normal limits.  Normal ovaries and no evidence of adnexal mass. Right upper quadrant ultrasound 10/23/2023:Liver appears normal.  Common bile duct measures 2 mm.  Gallbladder appears normal.  No ascites.  No acute abnormality. CT abdomen pelvis without contrast 09/07/2023:Gallbladder normal.  GI tract appears normal.  No adenopathy or ascites.  No acute intra-abdominal or pelvic abnormality. Mesenteric duplex ultrasound 06/20/2023:No abnormality evident within the mesenteric arteries. MRI abdomen 02/15/2023:No acute abnormality.  Celiac artery and superior mesenteric artery are patent.  The aortomesenteric angle measures 62 degrees which can be seen with median arcuate ligament syndrome.

## 2023-12-05 ENCOUNTER — TELEPHONE ENCOUNTER (OUTPATIENT)
Dept: URBAN - METROPOLITAN AREA CLINIC 63 | Facility: CLINIC | Age: 78
End: 2023-12-05

## 2023-12-08 ENCOUNTER — TELEPHONE ENCOUNTER (OUTPATIENT)
Dept: URBAN - METROPOLITAN AREA CLINIC 60 | Facility: CLINIC | Age: 78
End: 2023-12-08

## 2023-12-11 ENCOUNTER — TELEPHONE ENCOUNTER (OUTPATIENT)
Dept: URBAN - METROPOLITAN AREA CLINIC 63 | Facility: CLINIC | Age: 78
End: 2023-12-11

## 2023-12-16 LAB
HEMATOCRIT: 36.6
HEMOGLOBIN: 12.2
IMMUNOGLOBULIN A: 150
INTERPRETATION: (no result)
MCH: 30.9
MCHC: 33.3
MCV: 92.7
MPV: 9
PLATELET COUNT: 232
RDW: 14.3
RED BLOOD CELL COUNT: 3.95
TISSUE TRANSGLUTAMINASE AB, IGA: <1
WHITE BLOOD CELL COUNT: 5.6

## 2023-12-20 ENCOUNTER — OFFICE VISIT (OUTPATIENT)
Dept: URBAN - METROPOLITAN AREA CLINIC 60 | Facility: CLINIC | Age: 78
End: 2023-12-20
Payer: COMMERCIAL

## 2023-12-20 VITALS
TEMPERATURE: 97.9 F | WEIGHT: 112.8 LBS | HEIGHT: 63 IN | HEART RATE: 58 BPM | SYSTOLIC BLOOD PRESSURE: 120 MMHG | DIASTOLIC BLOOD PRESSURE: 70 MMHG | RESPIRATION RATE: 12 BRPM | BODY MASS INDEX: 19.99 KG/M2 | OXYGEN SATURATION: 100 %

## 2023-12-20 DIAGNOSIS — R10.84 GENERALIZED ABDOMINAL PAIN: ICD-10-CM

## 2023-12-20 DIAGNOSIS — K59.00 CONSTIPATION, UNSPECIFIED CONSTIPATION TYPE: ICD-10-CM

## 2023-12-20 PROCEDURE — 99214 OFFICE O/P EST MOD 30 MIN: CPT | Performed by: INTERNAL MEDICINE

## 2023-12-20 RX ORDER — BISMUTH SUBSALICYLATE 262 MG
TABLET,CHEWABLE ORAL AS NEEDED
Refills: 0 | Status: ACTIVE | COMMUNITY
Start: 2022-06-14

## 2023-12-20 RX ORDER — SENNA PLUS 8.6 MG/1
2 TABLETS AT BEDTIME AS NEEDED TABLET ORAL ONCE A DAY
Status: ACTIVE | COMMUNITY

## 2023-12-20 RX ORDER — PANTOPRAZOLE 20 MG/1
1 TABLET TABLET, DELAYED RELEASE ORAL PRN
Refills: 3 | Status: ACTIVE | COMMUNITY
Start: 2022-05-24

## 2023-12-20 RX ORDER — AMLODIPINE BESYLATE 2.5 MG/1
1 TABLET TABLET ORAL ONCE A DAY
Status: ACTIVE | COMMUNITY

## 2023-12-20 RX ORDER — SIMETHICONE 125 MG
1 CAPSULE AFTER MEALS AND AT BEDTIME AS NEEDED CAPSULE ORAL
Status: ACTIVE | COMMUNITY

## 2023-12-20 RX ORDER — CALCIUM CARBONATE 500 MG/1
TABLET, CHEWABLE ORAL AS NEEDED
Refills: 0 | Status: ACTIVE | COMMUNITY
Start: 2022-06-14

## 2023-12-20 RX ORDER — LORAZEPAM 1 MG/1
TABLET ORAL ONCE A DAY
Refills: 0 | Status: ACTIVE | COMMUNITY
Start: 2022-06-14

## 2023-12-20 RX ORDER — PANTOPRAZOLE SODIUM 40 MG/1
TABLET, DELAYED RELEASE ORAL TWICE A DAY
Refills: 0 | Status: ACTIVE | COMMUNITY
Start: 2019-12-05

## 2023-12-20 RX ORDER — STANDARDIZED SENNA CONCENTRATE 8.6 MG/1
2 TABLETS AT BEDTIME AS NEEDED TABLET ORAL ONCE A DAY
Status: ACTIVE | COMMUNITY

## 2023-12-20 NOTE — HPI-TODAY'S VISIT:
Alvina is a 78-year-old female presents for follow-up.  She is here to review recent labs which included a CBC and celiac panel.  CBC was normal.  Celiac serology negative.  She had a copy of her labs from her primary care physician which were done through [facility] 1 month ago.  Labs did reveal mild iron deficiency with iron saturation of 9 and ferritin 17.  She does not take oral iron supplements due to constipation.  In the office today, she continues to complain of chronic abdominal pain and constipation.  We reviewed her prior labs, imaging, EGD, colonoscopy.  She has tried multiple prescription and over-the-counter medications.  She usually only takes these for a very short amount of time because they cause some side effects or an additional symptom.  I have recommended several times that she seek a second GI opinion.

## 2024-01-02 ENCOUNTER — TELEPHONE ENCOUNTER (OUTPATIENT)
Dept: URBAN - METROPOLITAN AREA CLINIC 64 | Facility: CLINIC | Age: 79
End: 2024-01-02

## 2024-01-03 ENCOUNTER — TELEPHONE ENCOUNTER (OUTPATIENT)
Dept: URBAN - METROPOLITAN AREA CLINIC 64 | Facility: CLINIC | Age: 79
End: 2024-01-03

## 2024-01-04 ENCOUNTER — TELEPHONE ENCOUNTER (OUTPATIENT)
Dept: URBAN - METROPOLITAN AREA CLINIC 64 | Facility: CLINIC | Age: 79
End: 2024-01-04

## 2024-01-04 PROBLEM — 87522002: Status: ACTIVE | Noted: 2024-01-04

## 2024-01-16 ENCOUNTER — TELEPHONE ENCOUNTER (OUTPATIENT)
Dept: URBAN - METROPOLITAN AREA CLINIC 63 | Facility: CLINIC | Age: 79
End: 2024-01-16

## 2024-03-18 ENCOUNTER — OV EP (OUTPATIENT)
Dept: URBAN - METROPOLITAN AREA CLINIC 60 | Facility: CLINIC | Age: 79
End: 2024-03-18

## 2024-03-19 ENCOUNTER — OV EP (OUTPATIENT)
Dept: URBAN - METROPOLITAN AREA CLINIC 60 | Facility: CLINIC | Age: 79
End: 2024-03-19
Payer: MEDICARE

## 2024-03-19 ENCOUNTER — LAB (OUTPATIENT)
Dept: URBAN - METROPOLITAN AREA CLINIC 60 | Facility: CLINIC | Age: 79
End: 2024-03-19

## 2024-03-19 VITALS
OXYGEN SATURATION: 98 % | BODY MASS INDEX: 20.45 KG/M2 | TEMPERATURE: 97.8 F | HEIGHT: 63 IN | WEIGHT: 115.4 LBS | DIASTOLIC BLOOD PRESSURE: 68 MMHG | SYSTOLIC BLOOD PRESSURE: 122 MMHG | RESPIRATION RATE: 12 BRPM | HEART RATE: 79 BPM

## 2024-03-19 DIAGNOSIS — K59.00 CONSTIPATION, UNSPECIFIED CONSTIPATION TYPE: ICD-10-CM

## 2024-03-19 DIAGNOSIS — D50.8 OTHER IRON DEFICIENCY ANEMIA: ICD-10-CM

## 2024-03-19 DIAGNOSIS — R10.84 GENERALIZED ABDOMINAL PAIN: ICD-10-CM

## 2024-03-19 PROCEDURE — 99205 OFFICE O/P NEW HI 60 MIN: CPT | Performed by: INTERNAL MEDICINE

## 2024-03-19 RX ORDER — CALCIUM CARBONATE 500 MG/1
TABLET, CHEWABLE ORAL AS NEEDED
Refills: 0 | Status: ACTIVE | COMMUNITY
Start: 2022-06-14

## 2024-03-19 RX ORDER — PANTOPRAZOLE 20 MG/1
1 TABLET TABLET, DELAYED RELEASE ORAL PRN
Refills: 3 | Status: ACTIVE | COMMUNITY
Start: 2022-05-24

## 2024-03-19 RX ORDER — AMLODIPINE BESYLATE 2.5 MG/1
1 TABLET TABLET ORAL ONCE A DAY
Status: ACTIVE | COMMUNITY

## 2024-03-19 RX ORDER — PANTOPRAZOLE SODIUM 40 MG/1
TABLET, DELAYED RELEASE ORAL TWICE A DAY
Refills: 0 | Status: ACTIVE | COMMUNITY
Start: 2019-12-05

## 2024-03-19 RX ORDER — SENNA PLUS 8.6 MG/1
2 TABLETS AT BEDTIME AS NEEDED TABLET ORAL ONCE A DAY
Status: ACTIVE | COMMUNITY

## 2024-03-19 RX ORDER — STANDARDIZED SENNA CONCENTRATE 8.6 MG/1
2 TABLETS AT BEDTIME AS NEEDED TABLET ORAL ONCE A DAY
Status: ACTIVE | COMMUNITY

## 2024-03-19 RX ORDER — LORAZEPAM 1 MG/1
TABLET ORAL ONCE A DAY
Refills: 0 | Status: ACTIVE | COMMUNITY
Start: 2022-06-14

## 2024-03-19 RX ORDER — BISMUTH SUBSALICYLATE 262 MG
TABLET,CHEWABLE ORAL AS NEEDED
Refills: 0 | Status: ACTIVE | COMMUNITY
Start: 2022-06-14

## 2024-03-19 RX ORDER — SIMETHICONE 125 MG
1 CAPSULE AFTER MEALS AND AT BEDTIME AS NEEDED CAPSULE ORAL
Status: ACTIVE | COMMUNITY

## 2024-03-19 NOTE — PHYSICAL EXAM GASTROINTESTINAL
Abdomen , soft, MILD DIFFUSE TENDERNESS , nondistended , no guarding or rigidity , no masses palpable , normal bowel sounds , Liver and Spleen , no hepatomegaly present , no hepatosplenomegaly , liver nontender , spleen not palpable

## 2024-03-19 NOTE — HPI-TODAY'S VISIT:
Alvina is a 78-year-old female presents for follow-up.  Alvina is a pleasant 78-year-old female with history of chronic abdominal pain, multiple ER visits and was seen multiple gastroenterologist is here to establish care with me. She was last seen by Dr. Miller. She was in the ER again with abdominal pain and chest pain 2 days ago. Reviewed her labs which did not show any significant findings. No leukocytosis. No anemia. Iron deficiency with low ferritin and percent saturation noted She is allergic to contrast and therefore most of her imaging studies were CAT scans without contrast which did not show any acute findings. "I have terrible pain all over my stomach!". She reports she has had this for several years. Reports gas and bloating. Pepto-Bismol seems to help her symptoms. Also reports mild intermittent constipation and achieve some relief with smooth move tea. IBgard also seems to help her symptoms. She denies any rectal bleeding or melena. She reports that she weighs around 115 pounds and her weight has remained stable. She denies any nausea or vomiting. Her reflux is controlled with pantoprazole. Denies any dysphagia. She reports epigastric pain and with a prior history of weight loss is concerned about pancreatic cancer.    CBC was normal.  Celiac serology negative.  She does not take oral iron supplements due to constipation. She has been seen in our office in the past by several different physicians.  She has also been seen by several GI specialist outside of our office. She has had several recent hospital visits. With regards to her constipation, she has tried various over-the-counter laxatives including MiraLAX, Dulcolax, and Senokot.  She has also tried various prescription laxatives including Linzess, Amitiza, Motegrity, and Lactulose.  She says that she has not tolerated these medications well due to side effects. With regards to her abdominal pain, she has been evaluated in the emergency several different times.   She had MRA of the abdomen 02/2023.  This was unremarkable except for a aortomesenteric angle measuring 62 degrees and suggestion of possible median arcuate ligament syndrome.  Due to this, mesenteric Doppler was ordered which she had done 06/2023.  This was unremarkable. Other imaging has been unremarkable. VARIOUS IMAGING STUDIES are summarized below.   Her last colonoscopy was 10/06/2023. Colonoscopy revealed diverticulosis in the sigmoid colon, melanosis coli, and internal hemorrhoids. MULTIPLE ENDOSCOPIES AND COLONOSCOPIES AS NOTED BELOW

## 2024-03-19 NOTE — PHYSICAL EXAM CHEST:
no lesions, no deformities, no traumatic injuries, no significant scars are present, chest wall non-tender, no masses present, breathing is unlabored without accessory muscle use,normal breath sounds decreased strength

## 2024-03-19 NOTE — HPI-PREVIOUS PROCEDURES
Colonoscopy October 2023: Dr. Miller: Melanosis, sigmoid diverticulosis and grade 1 hemorrhoids no polyps. PCF is used. Procedure performed without difficulty. Quality of the bowel prep was good.-Biopsies negative for microscopic colitis. Normal terminal ileum.  April 2023 upper endoscopy Dr. Andrews Berry Mild erythematous gastritis and nodularity biopsy negative H. pylori, Hill grade 1 anatomy without gastric varices. Normal duodenum. Normal esophagus with intact GE junction  Upper endoscopy Dr. Irving January 2022: Normal esophagus, multiple small gastric polyps in the fundus and antrum.-Biopsied Normal duodenum with unremarkable biopsies  Colonoscopy 2019 Dr. Juan Henriquez Procedure performed with these, adequate bowel prep, 5 mm cecal polyp 5 mm transverse colon polyp 5 mm descending colon polyp 5 mm sigmoid colon polyp sigmoid diverticulosis, melanosis, grade 1 hemorrhoids  Upper endoscopy Dr. Menchaca 2019 Moderate antral gastritis H. pylori -6 cm hiatal hernia  Upper endoscopy 2016 Dr. Kyle: Mild to moderate H. pylori negative gastritis, unremarkable small bowel with unremarkable biopsies  Upper endoscopy 2014 Dr. Kyle: Normal esophagus, normal stomach, normal duodenum with unremarkable biopsies  Colonoscopy 2014 Dr. Kyle Melanosis coli, sigmoid diverticulosis and hemorrhoids  Colonoscopy 2009:Dr. Santana Real Fair prep, normal terminal ileum, and sigmoid diverticulosis and grade 1 hemorrhoids  Upper endoscopy 2009 Dr. Santana Real Small hiatal hernia, mild H. pylori negative gastritis, normal duodenum with unremarkable biopsies

## 2024-03-19 NOTE — HPI-PREVIOUS IMAGING
CT abdomen and pelvis without contrast March 15, 2024: No acute abnormality  HIDA scan 11/20/2023:Normal scan. Gallbladder ejection fraction 88%. No evidence of gallbladder dyskinesia.  Right upper quadrant ultrasound 10/23/2023:Liver appears normal. Common bile duct measures 2 mm. Gallbladder appears normal. No ascites. No acute abnormality.  CT abdomen and pelvis without contrast 11/23/2023:Diverticulosis without evidence of diverticulitis. Otherwise unremarkable CT scan.  Pelvic ultrasound 10/2010/2023:Pelvic ultrasound is within normal limits. Normal ovaries and no evidence of adnexal mass.  CT abdomen pelvis without contrast November 2023: Diverticulosis without diverticulitis, normal appendix, no renal stones,  CT abdomen pelvis without contrast 09/07/2023:Gallbladder normal. GI tract appears normal. No adenopathy or ascites. No acute intra-abdominal or pelvic abnormality.  Mesenteric duplex ultrasound 06/20/2023:No abnormality evident within the mesenteric arteries.  MRI abdomen 02/15/2023:No acute abnormality. Celiac artery and superior mesenteric artery are patent. The aortomesenteric angle SUGGESTED median arcuate ligament syndrome.

## 2024-03-19 NOTE — HPI-PREVIOUS LABS
Labs February 2024: WBC 5.1, hemoglobin 11 g, MCV 90, platelets 201 Stool for occult blood negative in October 2023 Normal electrolytes, BUN 8 creatinine 0.49, normal liver enzymes, Serum iron 69, TIBC 399, 17% saturation (L) ferritin 6 (L), vitamin B12 764, Urinalysis January 2024 moderate leukocyte esterase  Labs January 2024: WBC 6.4, hemoglobin 12.6 g MCV 92, platelets 252, Serum iron 51, TIBC 462, 11% saturation (L), ferritin 6.6 (L)

## 2024-03-21 ENCOUNTER — LAB (OUTPATIENT)
Dept: URBAN - METROPOLITAN AREA CLINIC 60 | Facility: CLINIC | Age: 79
End: 2024-03-21

## 2024-05-07 ENCOUNTER — OFFICE VISIT (OUTPATIENT)
Dept: URBAN - METROPOLITAN AREA CLINIC 60 | Facility: CLINIC | Age: 79
End: 2024-05-07
Payer: MEDICARE

## 2024-05-07 ENCOUNTER — LAB OUTSIDE AN ENCOUNTER (OUTPATIENT)
Dept: URBAN - METROPOLITAN AREA CLINIC 60 | Facility: CLINIC | Age: 79
End: 2024-05-07

## 2024-05-07 VITALS
BODY MASS INDEX: 19.91 KG/M2 | OXYGEN SATURATION: 98 % | DIASTOLIC BLOOD PRESSURE: 70 MMHG | HEART RATE: 73 BPM | SYSTOLIC BLOOD PRESSURE: 140 MMHG | RESPIRATION RATE: 12 BRPM | WEIGHT: 112.4 LBS | HEIGHT: 63 IN | TEMPERATURE: 97.8 F

## 2024-05-07 DIAGNOSIS — K58.1 IRRITABLE BOWEL SYNDROME WITH CONSTIPATION: ICD-10-CM

## 2024-05-07 DIAGNOSIS — R63.4 ABNORMAL WEIGHT LOSS: ICD-10-CM

## 2024-05-07 DIAGNOSIS — K59.00 CONSTIPATION, UNSPECIFIED CONSTIPATION TYPE: ICD-10-CM

## 2024-05-07 DIAGNOSIS — R14.0 ABDOMINAL BLOATING: ICD-10-CM

## 2024-05-07 DIAGNOSIS — R10.84 GENERALIZED ABDOMINAL PAIN: ICD-10-CM

## 2024-05-07 DIAGNOSIS — D50.9 IRON DEFICIENCY ANEMIA, UNSPECIFIED IRON DEFICIENCY ANEMIA TYPE: ICD-10-CM

## 2024-05-07 PROBLEM — 87522002: Status: ACTIVE | Noted: 2024-05-07

## 2024-05-07 PROBLEM — 116289008: Status: ACTIVE | Noted: 2024-05-07

## 2024-05-07 PROCEDURE — 99214 OFFICE O/P EST MOD 30 MIN: CPT | Performed by: INTERNAL MEDICINE

## 2024-05-07 RX ORDER — BISMUTH SUBSALICYLATE 262 MG
TABLET,CHEWABLE ORAL AS NEEDED
Refills: 0 | Status: ACTIVE | COMMUNITY
Start: 2022-06-14

## 2024-05-07 RX ORDER — SENNA PLUS 8.6 MG/1
2 TABLETS AT BEDTIME AS NEEDED TABLET ORAL ONCE A DAY
Status: ACTIVE | COMMUNITY

## 2024-05-07 RX ORDER — PANTOPRAZOLE SODIUM 40 MG/1
TABLET, DELAYED RELEASE ORAL TWICE A DAY
Refills: 0 | Status: ACTIVE | COMMUNITY
Start: 2019-12-05

## 2024-05-07 RX ORDER — CALCIUM CARBONATE 500 MG/1
TABLET, CHEWABLE ORAL AS NEEDED
Refills: 0 | Status: ACTIVE | COMMUNITY
Start: 2022-06-14

## 2024-05-07 RX ORDER — STANDARDIZED SENNA CONCENTRATE 8.6 MG/1
2 TABLETS AT BEDTIME AS NEEDED TABLET ORAL ONCE A DAY
Status: ACTIVE | COMMUNITY

## 2024-05-07 RX ORDER — LORAZEPAM 1 MG/1
TABLET ORAL ONCE A DAY
Refills: 0 | Status: ACTIVE | COMMUNITY
Start: 2022-06-14

## 2024-05-07 RX ORDER — DICYCLOMINE HYDROCHLORIDE 10 MG/1
2 CAPSULES CAPSULE ORAL THREE TIMES A DAY
Qty: 180 | OUTPATIENT
Start: 2024-05-07 | End: 2024-06-06

## 2024-05-07 RX ORDER — AMLODIPINE BESYLATE 2.5 MG/1
1 TABLET TABLET ORAL ONCE A DAY
Status: ACTIVE | COMMUNITY

## 2024-05-07 RX ORDER — SIMETHICONE 125 MG
1 CAPSULE AFTER MEALS AND AT BEDTIME AS NEEDED CAPSULE ORAL
Status: ACTIVE | COMMUNITY

## 2024-05-07 RX ORDER — PANTOPRAZOLE 20 MG/1
1 TABLET TABLET, DELAYED RELEASE ORAL PRN
Refills: 3 | Status: ACTIVE | COMMUNITY
Start: 2022-05-24

## 2024-05-10 ENCOUNTER — TELEPHONE ENCOUNTER (OUTPATIENT)
Dept: URBAN - METROPOLITAN AREA CLINIC 63 | Facility: CLINIC | Age: 79
End: 2024-05-10

## 2024-05-21 ENCOUNTER — OFFICE VISIT (OUTPATIENT)
Dept: URBAN - METROPOLITAN AREA CLINIC 60 | Facility: CLINIC | Age: 79
End: 2024-05-21
Payer: MEDICARE

## 2024-05-21 ENCOUNTER — DASHBOARD ENCOUNTERS (OUTPATIENT)
Age: 79
End: 2024-05-21

## 2024-05-21 VITALS
HEIGHT: 63 IN | WEIGHT: 111.8 LBS | DIASTOLIC BLOOD PRESSURE: 66 MMHG | RESPIRATION RATE: 12 BRPM | SYSTOLIC BLOOD PRESSURE: 126 MMHG | OXYGEN SATURATION: 98 % | HEART RATE: 68 BPM | BODY MASS INDEX: 19.81 KG/M2 | TEMPERATURE: 98.5 F

## 2024-05-21 DIAGNOSIS — R10.84 GENERALIZED ABDOMINAL PAIN: ICD-10-CM

## 2024-05-21 DIAGNOSIS — D50.9 IRON DEFICIENCY ANEMIA, UNSPECIFIED IRON DEFICIENCY ANEMIA TYPE: ICD-10-CM

## 2024-05-21 DIAGNOSIS — K59.00 CONSTIPATION, UNSPECIFIED CONSTIPATION TYPE: ICD-10-CM

## 2024-05-21 DIAGNOSIS — K58.1 IRRITABLE BOWEL SYNDROME WITH CONSTIPATION: ICD-10-CM

## 2024-05-21 DIAGNOSIS — R14.0 ABDOMINAL BLOATING: ICD-10-CM

## 2024-05-21 DIAGNOSIS — R63.4 ABNORMAL WEIGHT LOSS: ICD-10-CM

## 2024-05-21 PROCEDURE — 99214 OFFICE O/P EST MOD 30 MIN: CPT | Performed by: INTERNAL MEDICINE

## 2024-05-21 RX ORDER — AMLODIPINE BESYLATE 2.5 MG/1
1 TABLET TABLET ORAL ONCE A DAY
Status: ACTIVE | COMMUNITY

## 2024-05-21 RX ORDER — PANTOPRAZOLE 20 MG/1
1 TABLET TABLET, DELAYED RELEASE ORAL PRN
Refills: 3 | Status: ACTIVE | COMMUNITY
Start: 2022-05-24

## 2024-05-21 RX ORDER — PANTOPRAZOLE SODIUM 40 MG/1
TABLET, DELAYED RELEASE ORAL TWICE A DAY
Refills: 0 | Status: ACTIVE | COMMUNITY
Start: 2019-12-05

## 2024-05-21 RX ORDER — SENNA PLUS 8.6 MG/1
2 TABLETS AT BEDTIME AS NEEDED TABLET ORAL ONCE A DAY
Status: ACTIVE | COMMUNITY

## 2024-05-21 RX ORDER — BISMUTH SUBSALICYLATE 262 MG
TABLET,CHEWABLE ORAL AS NEEDED
Refills: 0 | Status: ACTIVE | COMMUNITY
Start: 2022-06-14

## 2024-05-21 RX ORDER — LORAZEPAM 1 MG/1
TABLET ORAL ONCE A DAY
Refills: 0 | Status: ACTIVE | COMMUNITY
Start: 2022-06-14

## 2024-05-21 RX ORDER — CALCIUM CARBONATE 500 MG/1
TABLET, CHEWABLE ORAL AS NEEDED
Refills: 0 | Status: ACTIVE | COMMUNITY
Start: 2022-06-14

## 2024-05-21 RX ORDER — GLYCERIN 5.4 G/1
1 ENEMA AS NEEDED LIQUID RECTAL ONCE A DAY
Status: ACTIVE | COMMUNITY

## 2024-05-21 RX ORDER — STANDARDIZED SENNA CONCENTRATE 8.6 MG/1
2 TABLETS AT BEDTIME AS NEEDED TABLET ORAL ONCE A DAY
Status: ACTIVE | COMMUNITY

## 2024-05-21 RX ORDER — ONDANSETRON HYDROCHLORIDE 8 MG/1
1 TABLET AS NEEDED TABLET, FILM COATED ORAL ONCE A DAY
Status: ACTIVE | COMMUNITY

## 2024-05-21 RX ORDER — SIMETHICONE 125 MG
1 CAPSULE AFTER MEALS AND AT BEDTIME AS NEEDED CAPSULE ORAL
Status: ACTIVE | COMMUNITY

## 2024-07-02 ENCOUNTER — OFFICE VISIT (OUTPATIENT)
Dept: URBAN - METROPOLITAN AREA CLINIC 60 | Facility: CLINIC | Age: 79
End: 2024-07-02

## 2024-08-22 ENCOUNTER — APPOINTMENT (RX ONLY)
Dept: URBAN - METROPOLITAN AREA CLINIC 121 | Facility: CLINIC | Age: 79
Setting detail: DERMATOLOGY
End: 2024-08-22

## 2024-08-22 DIAGNOSIS — L82.1 OTHER SEBORRHEIC KERATOSIS: ICD-10-CM

## 2024-08-22 DIAGNOSIS — L30.8 OTHER SPECIFIED DERMATITIS: ICD-10-CM

## 2024-08-22 DIAGNOSIS — L28.1 PRURIGO NODULARIS: ICD-10-CM

## 2024-08-22 DIAGNOSIS — D18.0 HEMANGIOMA: ICD-10-CM

## 2024-08-22 DIAGNOSIS — L81.4 OTHER MELANIN HYPERPIGMENTATION: ICD-10-CM

## 2024-08-22 PROBLEM — D18.01 HEMANGIOMA OF SKIN AND SUBCUTANEOUS TISSUE: Status: ACTIVE | Noted: 2024-08-22

## 2024-08-22 PROCEDURE — ? COUNSELING

## 2024-08-22 PROCEDURE — 99203 OFFICE O/P NEW LOW 30 MIN: CPT

## 2024-08-22 ASSESSMENT — LOCATION ZONE DERM
LOCATION ZONE: ARM
LOCATION ZONE: TRUNK
LOCATION ZONE: NECK

## 2024-08-22 ASSESSMENT — LOCATION DETAILED DESCRIPTION DERM
LOCATION DETAILED: LEFT ANTERIOR LATERAL PROXIMAL UPPER ARM
LOCATION DETAILED: RIGHT SUPERIOR MEDIAL UPPER BACK
LOCATION DETAILED: LEFT CLAVICULAR SKIN
LOCATION DETAILED: LEFT MEDIAL SUPERIOR CHEST
LOCATION DETAILED: LEFT INFERIOR LATERAL NECK

## 2024-08-22 ASSESSMENT — LOCATION SIMPLE DESCRIPTION DERM
LOCATION SIMPLE: RIGHT UPPER BACK
LOCATION SIMPLE: CHEST
LOCATION SIMPLE: LEFT ANTERIOR NECK
LOCATION SIMPLE: LEFT UPPER ARM
LOCATION SIMPLE: LEFT CLAVICULAR SKIN

## 2024-09-10 ENCOUNTER — OFFICE VISIT (OUTPATIENT)
Dept: URBAN - METROPOLITAN AREA CLINIC 60 | Facility: CLINIC | Age: 79
End: 2024-09-10

## 2025-07-16 ENCOUNTER — TELEPHONE ENCOUNTER (OUTPATIENT)
Dept: URBAN - METROPOLITAN AREA CLINIC 63 | Facility: CLINIC | Age: 80
End: 2025-07-16

## 2025-07-29 ENCOUNTER — TELEPHONE ENCOUNTER (OUTPATIENT)
Dept: URBAN - METROPOLITAN AREA CLINIC 7 | Facility: CLINIC | Age: 80
End: 2025-07-29

## 2025-07-29 ENCOUNTER — OFFICE VISIT (OUTPATIENT)
Dept: URBAN - METROPOLITAN AREA CLINIC 7 | Facility: CLINIC | Age: 80
End: 2025-07-29
Payer: MEDICARE

## 2025-07-29 DIAGNOSIS — R10.84 GENERALIZED ABDOMINAL PAIN: ICD-10-CM

## 2025-07-29 DIAGNOSIS — R11.0 NAUSEA: ICD-10-CM

## 2025-07-29 DIAGNOSIS — K64.8 OTHER HEMORRHOIDS: ICD-10-CM

## 2025-07-29 DIAGNOSIS — K58.1 IRRITABLE BOWEL SYNDROME WITH CONSTIPATION: ICD-10-CM

## 2025-07-29 DIAGNOSIS — K21.9 GASTROESOPHAGEAL REFLUX DISEASE, UNSPECIFIED WHETHER ESOPHAGITIS PRESENT: ICD-10-CM

## 2025-07-29 PROCEDURE — 99214 OFFICE O/P EST MOD 30 MIN: CPT

## 2025-07-29 RX ORDER — AMLODIPINE BESYLATE 2.5 MG/1
1 TABLET TABLET ORAL ONCE A DAY
Status: ACTIVE | COMMUNITY

## 2025-07-29 RX ORDER — PANTOPRAZOLE SODIUM 40 MG/1
TABLET, DELAYED RELEASE ORAL TWICE A DAY
Refills: 0 | Status: ACTIVE | COMMUNITY
Start: 2019-12-05

## 2025-07-29 RX ORDER — SENNOSIDES 8.6 MG/1
2 TABLETS AT BEDTIME AS NEEDED TABLET ORAL ONCE A DAY
Status: ACTIVE | COMMUNITY

## 2025-07-29 RX ORDER — BISMUTH SUBSALICYLATE 262 MG
TABLET,CHEWABLE ORAL AS NEEDED
Refills: 0 | Status: ACTIVE | COMMUNITY
Start: 2022-06-14

## 2025-07-29 RX ORDER — LORAZEPAM 1 MG/1
TABLET ORAL ONCE A DAY
Refills: 0 | Status: ACTIVE | COMMUNITY
Start: 2022-06-14

## 2025-07-29 RX ORDER — PANTOPRAZOLE 20 MG/1
1 TABLET TABLET, DELAYED RELEASE ORAL PRN
Refills: 3 | Status: ACTIVE | COMMUNITY
Start: 2022-05-24

## 2025-07-29 RX ORDER — ONDANSETRON 8 MG/1
1 TABLET ON THE TONGUE AND ALLOW TO DISSOLVE AS NEEDED TABLET, ORALLY DISINTEGRATING ORAL ONCE A DAY
Qty: 30 | Refills: 3 | OUTPATIENT
Start: 2025-07-29

## 2025-07-29 RX ORDER — CALCIUM CARBONATE 500 MG/1
TABLET, CHEWABLE ORAL AS NEEDED
Refills: 0 | Status: ACTIVE | COMMUNITY
Start: 2022-06-14

## 2025-07-29 RX ORDER — GLYCERIN 5.4 G/1
1 ENEMA AS NEEDED LIQUID RECTAL ONCE A DAY
Status: ACTIVE | COMMUNITY

## 2025-07-29 RX ORDER — DICYCLOMINE HYDROCHLORIDE 20 MG/1
1 TABLET TABLET ORAL THREE TIMES A DAY
Qty: 90 | OUTPATIENT
Start: 2025-07-29

## 2025-07-29 RX ORDER — ONDANSETRON 8 MG/1
1 TABLET ON THE TONGUE AND ALLOW TO DISSOLVE AS NEEDED TABLET, ORALLY DISINTEGRATING ORAL
Qty: 90 | Refills: 3 | OUTPATIENT
Start: 2025-07-29

## 2025-07-29 RX ORDER — ONDANSETRON HYDROCHLORIDE 8 MG/1
1 TABLET AS NEEDED TABLET, FILM COATED ORAL ONCE A DAY
Status: ACTIVE | COMMUNITY

## 2025-07-29 RX ORDER — STANDARDIZED SENNA CONCENTRATE 8.6 MG/1
2 TABLETS AT BEDTIME AS NEEDED TABLET ORAL ONCE A DAY
Status: ACTIVE | COMMUNITY

## 2025-07-29 RX ORDER — SIMETHICONE 125 MG
1 CAPSULE AFTER MEALS AND AT BEDTIME AS NEEDED CAPSULE ORAL
Status: ACTIVE | COMMUNITY

## 2025-07-29 NOTE — HPI-TODAY'S VISIT:
Previous patient last seen 2023 by Dr. Mckeon Seen today for ongoing abdominal pain and IBS - C. She reported persistent abdominal discomfort, worsened after meals, and ongoing constipation despite stool softeners and dietary changes. Hemorrhoids cause rectal pain with excessive straining but no bleeding, and she prefers to avoid surgery. Previous failed trials of MiraLAX, Linzess, Amitiza, lactulose, psyllium, and Metamucil.  Trulance not covered by insurance. Currently taking stool softener with Senna and smooth move tea daily. Currently not taking amitriptyline due to possible unwnated side effects.  Last EGD/EUS Dr. Tai 7/17/2024 gastritis otherwise unremarkable. - EGD 4/25/2023 Dr. Berry erythema and slight cobblestoning of gastric mucosa otherwise normal. -EGD 1/24/2022 Dr. Irving multiple gastric polyps otherwise normal  Last colonoscopy 10/6/2023 Dr. Miller internal hemorrhoids, diverticulosis sigmoid, melanosis. - Colonoscopy 9/26/2019 Dr. Juan Henriquez 5 mm polyp cecum, 5 mm polyp transverse, 5 mm polyp descending, mild diverticulosis sigmoid, internal hemorrhoids, 5 mm polyp sigmoid, melanosis.  FHX CRC denies  Alcohol use denies Tobacco use daily Drug use denies  Labs: 7/8/2025 RDW 15, MPV 8.7 otherwise CBC unremarkable, , BUN 5 otherwise CMP unremarkable. 1/8/2024 iron saturation 11, ferritin 6.6 Previous test for SIBO negative.   Imaging: CT abdomen pelvis without contrast 7/8/2025 unremarkable. Gastric emptying study 5/16/2024 rapid. MRI MRCP 4/10/24 normal renal and hepatic cysts not requiring follow-up HIDA scan 11/24/2023 normal

## 2025-08-01 ENCOUNTER — P2P PATIENT RECORD (OUTPATIENT)
Age: 80
End: 2025-08-01

## 2025-08-06 ENCOUNTER — TELEPHONE ENCOUNTER (OUTPATIENT)
Dept: URBAN - METROPOLITAN AREA CLINIC 7 | Facility: CLINIC | Age: 80
End: 2025-08-06